# Patient Record
Sex: MALE | Race: WHITE | Employment: UNEMPLOYED | URBAN - METROPOLITAN AREA
[De-identification: names, ages, dates, MRNs, and addresses within clinical notes are randomized per-mention and may not be internally consistent; named-entity substitution may affect disease eponyms.]

---
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
## 2018-05-05 ENCOUNTER — HOSPITAL ENCOUNTER (EMERGENCY)
Facility: HOSPITAL | Age: 18
Discharge: HOME | End: 2018-05-05
Attending: EMERGENCY MEDICINE
Payer: COMMERCIAL

## 2018-05-05 ENCOUNTER — APPOINTMENT (OUTPATIENT)
Dept: CT IMAGING | Facility: HOSPITAL | Age: 18
End: 2018-05-05
Attending: EMERGENCY MEDICINE
Payer: COMMERCIAL

## 2018-05-05 VITALS
HEART RATE: 57 BPM | RESPIRATION RATE: 16 BRPM | SYSTOLIC BLOOD PRESSURE: 133 MMHG | TEMPERATURE: 98 F | WEIGHT: 238 LBS | OXYGEN SATURATION: 98 % | DIASTOLIC BLOOD PRESSURE: 84 MMHG | BODY MASS INDEX: 27 KG/M2

## 2018-05-05 DIAGNOSIS — R11.2 NON-INTRACTABLE VOMITING WITH NAUSEA, UNSPECIFIED VOMITING TYPE: ICD-10-CM

## 2018-05-05 DIAGNOSIS — N20.0 KIDNEY STONES: Primary | ICD-10-CM

## 2018-05-05 PROCEDURE — 96375 TX/PRO/DX INJ NEW DRUG ADDON: CPT

## 2018-05-05 PROCEDURE — 74176 CT ABD & PELVIS W/O CONTRAST: CPT | Performed by: EMERGENCY MEDICINE

## 2018-05-05 PROCEDURE — 99284 EMERGENCY DEPT VISIT MOD MDM: CPT

## 2018-05-05 PROCEDURE — 85652 RBC SED RATE AUTOMATED: CPT | Performed by: EMERGENCY MEDICINE

## 2018-05-05 PROCEDURE — 83690 ASSAY OF LIPASE: CPT | Performed by: EMERGENCY MEDICINE

## 2018-05-05 PROCEDURE — 96361 HYDRATE IV INFUSION ADD-ON: CPT

## 2018-05-05 PROCEDURE — 81001 URINALYSIS AUTO W/SCOPE: CPT | Performed by: EMERGENCY MEDICINE

## 2018-05-05 PROCEDURE — 86140 C-REACTIVE PROTEIN: CPT | Performed by: EMERGENCY MEDICINE

## 2018-05-05 PROCEDURE — 85025 COMPLETE CBC W/AUTO DIFF WBC: CPT | Performed by: EMERGENCY MEDICINE

## 2018-05-05 PROCEDURE — S0028 INJECTION, FAMOTIDINE, 20 MG: HCPCS | Performed by: EMERGENCY MEDICINE

## 2018-05-05 PROCEDURE — 80053 COMPREHEN METABOLIC PANEL: CPT | Performed by: EMERGENCY MEDICINE

## 2018-05-05 PROCEDURE — 96374 THER/PROPH/DIAG INJ IV PUSH: CPT

## 2018-05-05 RX ORDER — ONDANSETRON 4 MG/1
4 TABLET, ORALLY DISINTEGRATING ORAL ONCE
Status: DISCONTINUED | OUTPATIENT
Start: 2018-05-05 | End: 2018-05-05

## 2018-05-05 RX ORDER — ONDANSETRON 4 MG/1
4 TABLET, ORALLY DISINTEGRATING ORAL EVERY 8 HOURS PRN
Qty: 10 TABLET | Refills: 0 | Status: SHIPPED | OUTPATIENT
Start: 2018-05-05 | End: 2018-05-12

## 2018-05-05 RX ORDER — ONDANSETRON 2 MG/ML
4 INJECTION INTRAMUSCULAR; INTRAVENOUS ONCE
Status: COMPLETED | OUTPATIENT
Start: 2018-05-05 | End: 2018-05-05

## 2018-05-05 RX ORDER — KETOROLAC TROMETHAMINE 30 MG/ML
30 INJECTION, SOLUTION INTRAMUSCULAR; INTRAVENOUS ONCE
Status: COMPLETED | OUTPATIENT
Start: 2018-05-05 | End: 2018-05-05

## 2018-05-05 RX ORDER — FAMOTIDINE 10 MG/ML
20 INJECTION, SOLUTION INTRAVENOUS ONCE
Status: COMPLETED | OUTPATIENT
Start: 2018-05-05 | End: 2018-05-05

## 2018-05-05 NOTE — ED PROVIDER NOTES
Patient Seen in: [facility] Emergency Department    History   Patient presents with:  Nausea/Vomiting/Diarrhea (gastrointestinal)    Stated Complaint: vomiting    HPI    Patient's 33-year-old said he had 10 days of intermittent vomiting and diarrhea hepatomegaly, no masses. No CVA tenderness or suprapubic tenderness. No pain at McBurney's point. No rebound or guarding. Normal bowel sounds. No scrotal pain or swelling. EXTREMITIES: Peripheral pulses are brisk in all 4 extremities.   Normal capilla stones in the cranio-caudal plane. Dose reduction techniques were used. Dose information is transmitted to the [facility] [employer] of Radiology) NRDR ([address]) which includes the Dose Index Registry.   PATIENT STATED HISTORY: (As 5/05/2018 at 16:02     Approved by: Tre Meehan MD            [facility]   Patient received 2 L of IV normal saline, IV Zofran, IV Pepcid, and IV Toradol. Patient said his symptoms greatly improved. Multiple small kidney stones without obstruction on CT.

## 2018-08-22 NOTE — LETTER
12/18/20        Solis Dominguez  [address]  [place] [address]      Dear Yemi Che,    [facility] records indicate that you have outstanding lab work and or testing that was ordered for you and has not yet been completed:  Orders Placed This Encounter Labs look good. Follow up as planned during your appointment.

## 2018-09-05 ENCOUNTER — LAB ENCOUNTER (OUTPATIENT)
Dept: LAB | Age: 18
End: 2018-09-05
Attending: DERMATOLOGY
Payer: COMMERCIAL

## 2018-09-05 DIAGNOSIS — L70.0 ACNE VULGARIS: ICD-10-CM

## 2018-09-05 LAB
ALBUMIN SERPL-MCNC: 4.3 G/DL (ref 3.5–4.8)
ALBUMIN/GLOB SERPL: 1.2 {RATIO} (ref 1–2)
ALP LIVER SERPL-CCNC: 65 U/L (ref 45–117)
ALT SERPL-CCNC: 29 U/L (ref 17–63)
ANION GAP SERPL CALC-SCNC: 8 MMOL/L (ref 0–18)
AST SERPL-CCNC: 16 U/L (ref 15–41)
BILIRUB SERPL-MCNC: 0.9 MG/DL (ref 0.1–2)
BUN BLD-MCNC: 13 MG/DL (ref 8–20)
BUN/CREAT SERPL: 11 (ref 10–20)
CALCIUM BLD-MCNC: 9.6 MG/DL (ref 8.3–10.3)
CHLORIDE SERPL-SCNC: 107 MMOL/L (ref 101–111)
CHOLEST SMN-MCNC: 138 MG/DL (ref ?–200)
CO2 SERPL-SCNC: 28 MMOL/L (ref 22–32)
CREAT BLD-MCNC: 1.18 MG/DL (ref 0.7–1.3)
GLOBULIN PLAS-MCNC: 3.6 G/DL (ref 2.5–4)
GLUCOSE BLD-MCNC: 81 MG/DL (ref 70–99)
HDLC SERPL-MCNC: 40 MG/DL (ref 40–59)
LDLC SERPL CALC-MCNC: 76 MG/DL (ref ?–100)
M PROTEIN MFR SERPL ELPH: 7.9 G/DL (ref 6.1–8.3)
NONHDLC SERPL-MCNC: 98 MG/DL (ref ?–130)
OSMOLALITY SERPL CALC.SUM OF ELEC: 295 MOSM/KG (ref 275–295)
POTASSIUM SERPL-SCNC: 4.6 MMOL/L (ref 3.6–5.1)
SODIUM SERPL-SCNC: 143 MMOL/L (ref 136–144)
TRIGL SERPL-MCNC: 110 MG/DL (ref 30–149)
VLDLC SERPL CALC-MCNC: 22 MG/DL (ref 0–30)

## 2018-09-05 PROCEDURE — 80053 COMPREHEN METABOLIC PANEL: CPT

## 2018-09-05 PROCEDURE — 80061 LIPID PANEL: CPT

## 2018-10-05 ENCOUNTER — LAB ENCOUNTER (OUTPATIENT)
Dept: LAB | Age: 18
End: 2018-10-05
Attending: DERMATOLOGY
Payer: COMMERCIAL

## 2018-10-05 DIAGNOSIS — Z79.899 ON ISOTRETINOIN THERAPY: ICD-10-CM

## 2018-10-05 DIAGNOSIS — L70.0 ACNE VULGARIS: ICD-10-CM

## 2018-10-05 PROCEDURE — 80053 COMPREHEN METABOLIC PANEL: CPT

## 2018-10-05 PROCEDURE — 80061 LIPID PANEL: CPT

## 2018-11-02 ENCOUNTER — APPOINTMENT (OUTPATIENT)
Dept: LAB | Age: 18
End: 2018-11-02
Attending: DERMATOLOGY
Payer: COMMERCIAL

## 2018-11-02 DIAGNOSIS — L70.0 ACNE VULGARIS: ICD-10-CM

## 2018-11-02 DIAGNOSIS — Z79.899 ON ISOTRETINOIN THERAPY: ICD-10-CM

## 2018-11-02 PROCEDURE — 80053 COMPREHEN METABOLIC PANEL: CPT

## 2018-11-02 PROCEDURE — 80061 LIPID PANEL: CPT

## 2019-02-04 ENCOUNTER — LAB ENCOUNTER (OUTPATIENT)
Dept: LAB | Age: 19
End: 2019-02-04
Attending: DERMATOLOGY
Payer: COMMERCIAL

## 2019-02-04 DIAGNOSIS — L70.0 ACNE VULGARIS: ICD-10-CM

## 2019-02-04 DIAGNOSIS — Z79.899 ON ISOTRETINOIN THERAPY: ICD-10-CM

## 2019-02-04 LAB
ALBUMIN SERPL-MCNC: 4.2 G/DL (ref 3.1–4.5)
ALBUMIN/GLOB SERPL: 1.2 {RATIO} (ref 1–2)
ALP LIVER SERPL-CCNC: 69 U/L (ref 45–117)
ALT SERPL-CCNC: 31 U/L (ref 17–63)
ANION GAP SERPL CALC-SCNC: 9 MMOL/L (ref 0–18)
AST SERPL-CCNC: 23 U/L (ref 15–41)
BILIRUB SERPL-MCNC: 0.6 MG/DL (ref 0.1–2)
BUN BLD-MCNC: 10 MG/DL (ref 8–20)
BUN/CREAT SERPL: 11.2 (ref 10–20)
CALCIUM BLD-MCNC: 9.4 MG/DL (ref 8.3–10.3)
CHLORIDE SERPL-SCNC: 107 MMOL/L (ref 101–111)
CHOLEST SMN-MCNC: 189 MG/DL (ref ?–200)
CO2 SERPL-SCNC: 27 MMOL/L (ref 22–32)
CREAT BLD-MCNC: 0.89 MG/DL (ref 0.7–1.3)
GLOBULIN PLAS-MCNC: 3.5 G/DL (ref 2.8–4.4)
GLUCOSE BLD-MCNC: 99 MG/DL (ref 70–99)
HDLC SERPL-MCNC: 40 MG/DL (ref 40–59)
LDLC SERPL CALC-MCNC: 117 MG/DL (ref ?–100)
M PROTEIN MFR SERPL ELPH: 7.7 G/DL (ref 6.4–8.2)
NONHDLC SERPL-MCNC: 149 MG/DL (ref ?–130)
OSMOLALITY SERPL CALC.SUM OF ELEC: 295 MOSM/KG (ref 275–295)
POTASSIUM SERPL-SCNC: 4.3 MMOL/L (ref 3.6–5.1)
SODIUM SERPL-SCNC: 143 MMOL/L (ref 136–144)
TRIGL SERPL-MCNC: 159 MG/DL (ref 30–149)
VLDLC SERPL CALC-MCNC: 32 MG/DL (ref 0–30)

## 2019-02-04 PROCEDURE — 80061 LIPID PANEL: CPT

## 2019-02-04 PROCEDURE — 80053 COMPREHEN METABOLIC PANEL: CPT

## 2020-06-08 ENCOUNTER — OFFICE VISIT (OUTPATIENT)
Dept: SURGERY | Facility: CLINIC | Age: 20
End: 2020-06-08

## 2020-06-08 VITALS
BODY MASS INDEX: 25 KG/M2 | TEMPERATURE: 98 F | DIASTOLIC BLOOD PRESSURE: 72 MMHG | HEART RATE: 77 BPM | WEIGHT: 220 LBS | SYSTOLIC BLOOD PRESSURE: 109 MMHG

## 2020-06-08 DIAGNOSIS — Z84.81 FAMILY HISTORY OF BRCA GENE MUTATION: ICD-10-CM

## 2020-06-08 DIAGNOSIS — N63.0 BREAST MASS IN MALE: ICD-10-CM

## 2020-06-08 DIAGNOSIS — Z80.3 FAMILY HISTORY OF BREAST CANCER IN MOTHER: ICD-10-CM

## 2020-06-08 DIAGNOSIS — N62 GYNECOMASTIA: Primary | ICD-10-CM

## 2020-06-08 PROCEDURE — 99245 OFF/OP CONSLTJ NEW/EST HI 55: CPT | Performed by: COLON & RECTAL SURGERY

## 2020-06-08 NOTE — PATIENT INSTRUCTIONS
The patient presents today in consultation of Dr. Marcela Miller with his mother for breast pain and masses palpated on the breast.    The patient states that the last 4 years he has noticed changes around his nipples looking \"puffy. \"  The patient states that possible that the isotretinoin could have affected his hormone levels causing his breast bud development to increase.   I also discussed with patient that physiologically it is common for males between the ages of 13 and 34 to have some breast bud developme

## 2020-06-08 NOTE — H&P
New Patient Visit Note       Active Problems      1. Gynecomastia    2. Breast mass in male    3. Family history of breast cancer in mother at 46    2.  Family history of BRCA gene mutation        Chief Complaint   Patient presents with:  Breast Problem: pt performed my own physical exam and obtained the history as detailed above.   I have made all appropriate changes in documentation as necessary  I attest to the accuracy of this note as detailed above    Tracie Mckenzie MD FACS FASCRS    My total face time Genitourinary: Negative for difficulty urinating, dysuria, frequency and urgency. Musculoskeletal: Negative for arthralgias and myalgias. Skin: Negative for color change and rash. Neurological: Negative for tremors, syncope and weakness.    Hematolo behind the left nipple. There are no other palpable masses. There is no left axillary lymphadenopathy. There are no skin changes or nipple drainage. Abdominal: Soft.  Normal appearance and bowel sounds are normal. He exhibits no shifting dullness, no d 1 year it has continued to worsen. He does feel that there is some masses around his nipples as well. The patient states that he has not had any skin changes, nipple drainage or discharge.   He states that he did notice some change in his breast when he hormones. I discussed the patient that it is a good sign that it is bilaterally present, and that they are painful. It is unlikely that this is a cancer. However, the patient does have strong family history.     We will be working the patient up for ot

## 2020-06-18 ENCOUNTER — HOSPITAL ENCOUNTER (OUTPATIENT)
Dept: ULTRASOUND IMAGING | Age: 20
Discharge: HOME | End: 2020-06-18
Attending: COLON & RECTAL SURGERY
Payer: COMMERCIAL

## 2020-06-18 ENCOUNTER — APPOINTMENT (OUTPATIENT)
Dept: LAB | Age: 20
End: 2020-06-18
Attending: PHYSICIAN ASSISTANT
Payer: COMMERCIAL

## 2020-06-18 DIAGNOSIS — N62 GYNECOMASTIA: ICD-10-CM

## 2020-06-18 PROCEDURE — 84703 CHORIONIC GONADOTROPIN ASSAY: CPT

## 2020-06-18 PROCEDURE — 76870 US EXAM SCROTUM: CPT | Performed by: COLON & RECTAL SURGERY

## 2020-06-18 PROCEDURE — 84146 ASSAY OF PROLACTIN: CPT

## 2020-06-18 PROCEDURE — 93975 VASCULAR STUDY: CPT | Performed by: COLON & RECTAL SURGERY

## 2020-06-18 PROCEDURE — 82670 ASSAY OF TOTAL ESTRADIOL: CPT

## 2020-06-18 PROCEDURE — 84403 ASSAY OF TOTAL TESTOSTERONE: CPT

## 2020-06-18 PROCEDURE — 84402 ASSAY OF FREE TESTOSTERONE: CPT

## 2020-06-18 PROCEDURE — 84443 ASSAY THYROID STIM HORMONE: CPT

## 2020-06-18 PROCEDURE — 84704 HCG FREE BETACHAIN TEST: CPT

## 2020-06-18 PROCEDURE — 83002 ASSAY OF GONADOTROPIN (LH): CPT

## 2020-06-18 PROCEDURE — 36415 COLL VENOUS BLD VENIPUNCTURE: CPT

## 2020-06-19 ENCOUNTER — TELEPHONE (OUTPATIENT)
Dept: SURGERY | Facility: CLINIC | Age: 20
End: 2020-06-19

## 2020-06-19 ENCOUNTER — HOSPITAL ENCOUNTER (OUTPATIENT)
Dept: MAMMOGRAPHY | Age: 20
Discharge: HOME | End: 2020-06-19
Attending: COLON & RECTAL SURGERY
Payer: COMMERCIAL

## 2020-06-19 ENCOUNTER — HOSPITAL ENCOUNTER (OUTPATIENT)
Dept: ULTRASOUND IMAGING | Age: 20
Discharge: HOME | End: 2020-06-19
Attending: COLON & RECTAL SURGERY
Payer: COMMERCIAL

## 2020-06-19 DIAGNOSIS — N62 GYNECOMASTIA: ICD-10-CM

## 2020-06-19 DIAGNOSIS — N62 GYNECOMASTIA: Primary | ICD-10-CM

## 2020-06-19 PROCEDURE — 77062 BREAST TOMOSYNTHESIS BI: CPT | Performed by: COLON & RECTAL SURGERY

## 2020-06-19 PROCEDURE — 77066 DX MAMMO INCL CAD BI: CPT | Performed by: COLON & RECTAL SURGERY

## 2020-06-19 PROCEDURE — 76641 ULTRASOUND BREAST COMPLETE: CPT | Performed by: COLON & RECTAL SURGERY

## 2020-06-19 NOTE — TELEPHONE ENCOUNTER
PAT called to correct the HCG order, a HCG qualitative was ordered which is a pregnancy test, a HCG Tumor marker needed to be ordered.

## 2020-06-23 NOTE — PROGRESS NOTES
Future Appointments  6/29/2020  5:15 PM    Dafne Crawley MD          [facility]

## 2020-06-29 ENCOUNTER — OFFICE VISIT (OUTPATIENT)
Dept: SURGERY | Facility: CLINIC | Age: 20
End: 2020-06-29

## 2020-06-29 VITALS — WEIGHT: 220 LBS | BODY MASS INDEX: 25.45 KG/M2 | HEIGHT: 78 IN | TEMPERATURE: 98 F

## 2020-06-29 DIAGNOSIS — N62 GYNECOMASTIA: ICD-10-CM

## 2020-06-29 DIAGNOSIS — Z84.81 FAMILY HISTORY OF BRCA GENE MUTATION: ICD-10-CM

## 2020-06-29 DIAGNOSIS — N63.0 BREAST MASS IN MALE: Primary | ICD-10-CM

## 2020-06-29 DIAGNOSIS — Z80.3 FAMILY HISTORY OF BREAST CANCER IN MOTHER: ICD-10-CM

## 2020-06-29 PROCEDURE — 99213 OFFICE O/P EST LOW 20 MIN: CPT | Performed by: COLON & RECTAL SURGERY

## 2020-06-29 NOTE — PATIENT INSTRUCTIONS
The patient presents today for continued care and evaluation of his bilateral gynecomastia. The patient presents today after undergoing multiple laboratory studies and imaging after being seen for his bilateral gynecomastia.     The patient states that h breast masses. All of the laboratory and imaging results were reviewed with both the patient as well as his mother. All of their questions were answered at this time. They both verbalized understanding agreement the plan of care.     We will see the aida

## 2020-06-29 NOTE — PROGRESS NOTES
Follow Up Visit Note       Active Problems      1. Breast mass in male    2. Gynecomastia    3. Family history of breast cancer in mother at 46    2.  Family history of BRCA gene mutation          Chief Complaint   Patient presents with:  Breast Problem: Es on the above listed diagnoses and treatment options. Allergies  Cate Felling is allergic to erythromycin. Past Medical / Surgical / Social / Family History    The past medical and past surgical history have been reviewed by me today.     Past Medical His bruise/bleed easily. Psychiatric/Behavioral: Negative for behavioral problems and sleep disturbance.         Physical Findings   Temp 98 °F (36.7 °C) (Oral)   Ht 79\"   Wt 220 lb (99.8 kg)   BMI 24.78 kg/m²   Physical Exam   Constitutional: He is oriented negative in the ventral area, confirmed negative in the right inguinal area and confirmed negative in the left inguinal area. Musculoskeletal: Normal range of motion. Lymphadenopathy:     He has no cervical adenopathy.    Neurological: He is alert and o behind the left nipple that is smaller than the mass on the right. There are no other palpable masses. There is no left axillary lymphadenopathy. There are no skin changes or nipple drainage.     It was discussed with the patient that there was some asym

## 2020-07-11 ENCOUNTER — APPOINTMENT (OUTPATIENT)
Dept: CT IMAGING | Facility: HOSPITAL | Age: 20
End: 2020-07-11
Attending: EMERGENCY MEDICINE
Payer: COMMERCIAL

## 2020-07-11 ENCOUNTER — HOSPITAL ENCOUNTER (EMERGENCY)
Facility: HOSPITAL | Age: 20
Discharge: HOME | End: 2020-07-11
Attending: EMERGENCY MEDICINE
Payer: COMMERCIAL

## 2020-07-11 VITALS
OXYGEN SATURATION: 100 % | WEIGHT: 215 LBS | DIASTOLIC BLOOD PRESSURE: 75 MMHG | HEIGHT: 78 IN | SYSTOLIC BLOOD PRESSURE: 119 MMHG | RESPIRATION RATE: 14 BRPM | HEART RATE: 67 BPM | TEMPERATURE: 98 F | BODY MASS INDEX: 24.88 KG/M2

## 2020-07-11 DIAGNOSIS — R10.9 FLANK PAIN: Primary | ICD-10-CM

## 2020-07-11 DIAGNOSIS — R30.0 DYSURIA: ICD-10-CM

## 2020-07-11 LAB
ALBUMIN SERPL-MCNC: 4.3 G/DL (ref 3.4–5)
ALBUMIN/GLOB SERPL: 1.3 {RATIO} (ref 1–2)
ALP LIVER SERPL-CCNC: 73 U/L (ref 45–117)
ALT SERPL-CCNC: 32 U/L (ref 16–61)
ANION GAP SERPL CALC-SCNC: 8 MMOL/L (ref 0–18)
AST SERPL-CCNC: 22 U/L (ref 15–37)
BASOPHILS # BLD AUTO: 0.03 X10(3) UL (ref 0–0.2)
BASOPHILS NFR BLD AUTO: 0.3 %
BILIRUB SERPL-MCNC: 1.3 MG/DL (ref 0.1–2)
BILIRUB UR QL STRIP.AUTO: NEGATIVE
BUN BLD-MCNC: 12 MG/DL (ref 7–18)
BUN/CREAT SERPL: 10.7 (ref 10–20)
CALCIUM BLD-MCNC: 9.5 MG/DL (ref 8.5–10.1)
CHLORIDE SERPL-SCNC: 106 MMOL/L (ref 98–112)
CLARITY UR REFRACT.AUTO: CLEAR
CO2 SERPL-SCNC: 26 MMOL/L (ref 21–32)
COLOR UR AUTO: YELLOW
CREAT BLD-MCNC: 1.12 MG/DL (ref 0.7–1.3)
DEPRECATED RDW RBC AUTO: 39.1 FL (ref 35.1–46.3)
EOSINOPHIL # BLD AUTO: 0.09 X10(3) UL (ref 0–0.7)
EOSINOPHIL NFR BLD AUTO: 0.8 %
ERYTHROCYTE [DISTWIDTH] IN BLOOD BY AUTOMATED COUNT: 12.2 % (ref 11–15)
GLOBULIN PLAS-MCNC: 3.2 G/DL (ref 2.8–4.4)
GLUCOSE BLD-MCNC: 106 MG/DL (ref 70–99)
GLUCOSE UR STRIP.AUTO-MCNC: NEGATIVE MG/DL
HCT VFR BLD AUTO: 45.7 % (ref 39–53)
HGB BLD-MCNC: 15.2 G/DL (ref 13–17.5)
IMM GRANULOCYTES # BLD AUTO: 0.07 X10(3) UL (ref 0–1)
IMM GRANULOCYTES NFR BLD: 0.7 %
KETONES UR STRIP.AUTO-MCNC: NEGATIVE MG/DL
LIPASE SERPL-CCNC: 47 U/L (ref 73–393)
LYMPHOCYTES # BLD AUTO: 3.07 X10(3) UL (ref 1–4)
LYMPHOCYTES NFR BLD AUTO: 28.7 %
M PROTEIN MFR SERPL ELPH: 7.5 G/DL (ref 6.4–8.2)
MCH RBC QN AUTO: 29.1 PG (ref 26–34)
MCHC RBC AUTO-ENTMCNC: 33.3 G/DL (ref 31–37)
MCV RBC AUTO: 87.4 FL (ref 80–100)
MONOCYTES # BLD AUTO: 1.22 X10(3) UL (ref 0.1–1)
MONOCYTES NFR BLD AUTO: 11.4 %
NEUTROPHILS # BLD AUTO: 6.23 X10 (3) UL (ref 1.5–7.7)
NEUTROPHILS # BLD AUTO: 6.23 X10(3) UL (ref 1.5–7.7)
NEUTROPHILS NFR BLD AUTO: 58.1 %
NITRITE UR QL STRIP.AUTO: NEGATIVE
OSMOLALITY SERPL CALC.SUM OF ELEC: 290 MOSM/KG (ref 275–295)
PH UR STRIP.AUTO: 5 [PH] (ref 4.5–8)
PLATELET # BLD AUTO: 204 10(3)UL (ref 150–450)
POTASSIUM SERPL-SCNC: 3.6 MMOL/L (ref 3.5–5.1)
PROT UR STRIP.AUTO-MCNC: NEGATIVE MG/DL
RBC # BLD AUTO: 5.23 X10(6)UL (ref 4.3–5.7)
RBC #/AREA URNS AUTO: >10 /HPF
SODIUM SERPL-SCNC: 140 MMOL/L (ref 136–145)
SP GR UR STRIP.AUTO: 1.03 (ref 1–1.03)
UROBILINOGEN UR STRIP.AUTO-MCNC: <2 MG/DL
WBC # BLD AUTO: 10.7 X10(3) UL (ref 4–11)

## 2020-07-11 PROCEDURE — 87086 URINE CULTURE/COLONY COUNT: CPT | Performed by: EMERGENCY MEDICINE

## 2020-07-11 PROCEDURE — 99284 EMERGENCY DEPT VISIT MOD MDM: CPT

## 2020-07-11 PROCEDURE — 74176 CT ABD & PELVIS W/O CONTRAST: CPT | Performed by: EMERGENCY MEDICINE

## 2020-07-11 PROCEDURE — 83690 ASSAY OF LIPASE: CPT | Performed by: EMERGENCY MEDICINE

## 2020-07-11 PROCEDURE — 96365 THER/PROPH/DIAG IV INF INIT: CPT

## 2020-07-11 PROCEDURE — 81001 URINALYSIS AUTO W/SCOPE: CPT | Performed by: EMERGENCY MEDICINE

## 2020-07-11 PROCEDURE — 96375 TX/PRO/DX INJ NEW DRUG ADDON: CPT

## 2020-07-11 PROCEDURE — 80053 COMPREHEN METABOLIC PANEL: CPT | Performed by: EMERGENCY MEDICINE

## 2020-07-11 PROCEDURE — 96361 HYDRATE IV INFUSION ADD-ON: CPT

## 2020-07-11 PROCEDURE — 85025 COMPLETE CBC W/AUTO DIFF WBC: CPT | Performed by: EMERGENCY MEDICINE

## 2020-07-11 RX ORDER — HYDROMORPHONE HYDROCHLORIDE 1 MG/ML
0.5 INJECTION, SOLUTION INTRAMUSCULAR; INTRAVENOUS; SUBCUTANEOUS ONCE
Status: COMPLETED | OUTPATIENT
Start: 2020-07-11 | End: 2020-07-11

## 2020-07-11 RX ORDER — SULFAMETHOXAZOLE AND TRIMETHOPRIM 800; 160 MG/1; MG/1
1 TABLET ORAL 2 TIMES DAILY
Qty: 20 TABLET | Refills: 0 | Status: SHIPPED | OUTPATIENT
Start: 2020-07-11 | End: 2020-07-21

## 2020-07-11 RX ORDER — SODIUM CHLORIDE 9 MG/ML
INJECTION, SOLUTION INTRAVENOUS CONTINUOUS
Status: DISCONTINUED | OUTPATIENT
Start: 2020-07-11 | End: 2020-07-11

## 2020-07-11 RX ORDER — HYDROMORPHONE HYDROCHLORIDE 1 MG/ML
1 INJECTION, SOLUTION INTRAMUSCULAR; INTRAVENOUS; SUBCUTANEOUS EVERY 30 MIN PRN
Status: DISCONTINUED | OUTPATIENT
Start: 2020-07-11 | End: 2020-07-11

## 2020-07-11 RX ORDER — ONDANSETRON 2 MG/ML
4 INJECTION INTRAMUSCULAR; INTRAVENOUS ONCE
Status: COMPLETED | OUTPATIENT
Start: 2020-07-11 | End: 2020-07-11

## 2020-07-11 RX ORDER — ONDANSETRON 8 MG/1
8 TABLET, ORALLY DISINTEGRATING ORAL EVERY 6 HOURS PRN
Qty: 10 TABLET | Refills: 0 | Status: SHIPPED | OUTPATIENT
Start: 2020-07-11 | End: 2020-11-17 | Stop reason: ALTCHOICE

## 2020-07-11 RX ORDER — HYDROCODONE BITARTRATE AND ACETAMINOPHEN 5; 325 MG/1; MG/1
1-2 TABLET ORAL EVERY 6 HOURS PRN
Qty: 10 TABLET | Refills: 0 | Status: SHIPPED | OUTPATIENT
Start: 2020-07-11 | End: 2020-07-18

## 2020-07-11 RX ORDER — ONDANSETRON 2 MG/ML
4 INJECTION INTRAMUSCULAR; INTRAVENOUS
Status: DISCONTINUED | OUTPATIENT
Start: 2020-07-11 | End: 2020-07-11

## 2020-07-11 RX ORDER — AZITHROMYCIN 250 MG/1
1000 TABLET, FILM COATED ORAL ONCE
Status: COMPLETED | OUTPATIENT
Start: 2020-07-11 | End: 2020-07-11

## 2020-07-11 NOTE — ED INITIAL ASSESSMENT (HPI)
Pt c/o R flank pain started about an hour ago with dysuria and white discharge from penis. Pt states only being with 1 person a month ago and was with her for 3 months.

## 2020-07-11 NOTE — ED PROVIDER NOTES
Patient Seen in: [facility] Emergency Department      History   Patient presents with:  Abdomen/Flank Pain    Stated Complaint: right flank pain    HPI    Patient complains of right-sided flank pain that started about 1 hour prior to arrival.  There S2, without murmur or rub. Distal pulses are strong and symmetric. Abdomen: Soft, nondistended. Mildly tender in the right mid abdomen. Mild right CVA tenderness also noted. Extremities: Unremarkable. Calves nonswollen, symmetric, nontender.   No peda flank pain suggest renal colic. He has a family history of the same  Patient is reporting some discharge from the penis ongoing for about for 5 days prior to arrival.  He is sexually active. STD such as gonorrhea or chlamydia considered.   STD probe from taking these medications    Sulfamethoxazole-TMP -160 MG Oral Tab per tablet  Take 1 tablet by mouth 2 (two) times daily for 10 days.   Qty: 20 tablet Refills: 0    ondansetron 8 MG Oral Tablet Dispersible  Take 1 tablet (8 mg total) by mouth every 6

## 2020-11-17 ENCOUNTER — OFFICE VISIT (OUTPATIENT)
Dept: FAMILY MEDICINE CLINIC | Facility: CLINIC | Age: 20
End: 2020-11-17

## 2020-11-17 VITALS
HEART RATE: 69 BPM | TEMPERATURE: 97 F | RESPIRATION RATE: 16 BRPM | SYSTOLIC BLOOD PRESSURE: 115 MMHG | OXYGEN SATURATION: 98 % | WEIGHT: 233 LBS | DIASTOLIC BLOOD PRESSURE: 72 MMHG | HEIGHT: 78 IN | BODY MASS INDEX: 26.96 KG/M2

## 2020-11-17 DIAGNOSIS — Z23 NEED FOR VACCINATION: ICD-10-CM

## 2020-11-17 DIAGNOSIS — Z00.00 ANNUAL PHYSICAL EXAM: Primary | ICD-10-CM

## 2020-11-17 DIAGNOSIS — E55.9 VITAMIN D DEFICIENCY: ICD-10-CM

## 2020-11-17 DIAGNOSIS — L65.9 HAIR LOSS: ICD-10-CM

## 2020-11-17 PROCEDURE — 3078F DIAST BP <80 MM HG: CPT | Performed by: FAMILY MEDICINE

## 2020-11-17 PROCEDURE — 3074F SYST BP LT 130 MM HG: CPT | Performed by: FAMILY MEDICINE

## 2020-11-17 PROCEDURE — 99385 PREV VISIT NEW AGE 18-39: CPT | Performed by: FAMILY MEDICINE

## 2020-11-17 PROCEDURE — 90686 IIV4 VACC NO PRSV 0.5 ML IM: CPT | Performed by: FAMILY MEDICINE

## 2020-11-17 PROCEDURE — 90471 IMMUNIZATION ADMIN: CPT | Performed by: FAMILY MEDICINE

## 2020-11-17 PROCEDURE — 3008F BODY MASS INDEX DOCD: CPT | Performed by: FAMILY MEDICINE

## 2020-11-17 RX ORDER — ALBUTEROL SULFATE 90 UG/1
2 AEROSOL, METERED RESPIRATORY (INHALATION) EVERY 6 HOURS PRN
Qty: 1 INHALER | Refills: 1 | Status: SHIPPED | OUTPATIENT
Start: 2020-11-17

## 2020-11-18 NOTE — H&P
Emilee Fuller is a 23year old male who presents for a complete physical exam.   HPI:   Hair Loss  Pt complains of hair loss. This is a chronic problem which onset over 1 year ago. This problem has been gradually worsening.  The hair loss is equally sprea changes in stream, denies erectile dysfunction  MS: denies back pain, arthralgias or myalgias  NEURO: denies headaches or dizziness  PSYCH: denies depression or anxiety  HEMATOLOGIC: denies hx of anemia, easy bruising or bleeding  ENDOCRINE:denies frequent complete testosterone level to rule out low testosterone. He should begin rogaine for treatment.   - TESTOSTERONE,TOTAL AND FREE MALE; Future  Orders Placed This Encounter      CBC W Differential W Platelet [E]          Standing Status: Future          Manus Cardinal

## 2020-12-10 ENCOUNTER — TELEMEDICINE (OUTPATIENT)
Dept: FAMILY MEDICINE CLINIC | Facility: CLINIC | Age: 20
End: 2020-12-10

## 2020-12-10 DIAGNOSIS — B00.1 RECURRENT COLD SORES: Primary | ICD-10-CM

## 2020-12-10 PROCEDURE — 99213 OFFICE O/P EST LOW 20 MIN: CPT | Performed by: INTERNAL MEDICINE

## 2020-12-10 RX ORDER — VALACYCLOVIR HYDROCHLORIDE 1 G/1
TABLET, FILM COATED ORAL
Qty: 16 TABLET | Refills: 1 | Status: SHIPPED | OUTPATIENT
Start: 2020-12-10

## 2020-12-10 RX ORDER — VALACYCLOVIR HYDROCHLORIDE 1 G/1
TABLET, FILM COATED ORAL
Qty: 16 TABLET | Refills: 1 | Status: SHIPPED | OUTPATIENT
Start: 2020-12-10 | End: 2020-12-10

## 2020-12-10 NOTE — PROGRESS NOTES
Video Visit  Emilee Fuller is a 23year old male. Patient presents with:  Medication Follow-Up: doximity video      HPI:   Pt requests a virtual visit due to the Covid pandemic to discuss recurrent cold sores.   + FH both parents with recurrent cold sore and ways to reduce transmission      Follow up if not better, pt agrees          The patient indicates understanding of these issues and agrees to the plan. No follow-ups on file.       Sherel Boeck, NP  12/10/2020  9:29 AM    Clara Veliz und

## 2021-02-23 ENCOUNTER — HOSPITAL ENCOUNTER (OUTPATIENT)
Age: 21
Discharge: HOME | End: 2021-02-23
Attending: EMERGENCY MEDICINE
Payer: COMMERCIAL

## 2021-02-23 VITALS
DIASTOLIC BLOOD PRESSURE: 47 MMHG | WEIGHT: 235 LBS | SYSTOLIC BLOOD PRESSURE: 115 MMHG | HEIGHT: 78 IN | RESPIRATION RATE: 20 BRPM | BODY MASS INDEX: 27.19 KG/M2 | HEART RATE: 88 BPM | TEMPERATURE: 98 F | OXYGEN SATURATION: 96 %

## 2021-02-23 DIAGNOSIS — J02.9 VIRAL PHARYNGITIS: Primary | ICD-10-CM

## 2021-02-23 LAB — POCT RAPID STREP: NEGATIVE

## 2021-02-23 PROCEDURE — 99213 OFFICE O/P EST LOW 20 MIN: CPT

## 2021-02-23 PROCEDURE — 87880 STREP A ASSAY W/OPTIC: CPT | Performed by: EMERGENCY MEDICINE

## 2021-02-23 PROCEDURE — 87081 CULTURE SCREEN ONLY: CPT | Performed by: EMERGENCY MEDICINE

## 2021-02-23 PROCEDURE — 99214 OFFICE O/P EST MOD 30 MIN: CPT

## 2021-02-23 NOTE — ED INITIAL ASSESSMENT (HPI)
Pt presents today with c/o a dry throat x 1 week. Pt denies any fevers. Pt tested negative for covid x a few days ago.

## 2021-02-23 NOTE — ED PROVIDER NOTES
Patient Seen in: [facility] [place]      History   Patient presents with:  Throat Problem    Stated Complaint: DRY THROAT     HPI/Subjective:   HPI    Patient is a 67-year-old male otherwise healthy presents the immediate care with a history of d light. Oropharynx is mildly erythematous not exudate or abscess. The patient's uvula is midline. Mucous membranes are moist.  NECK: There is no focal tenderness to palpation appreciated. There is no JVD. No meningeal signs or nuchal rigidity appreciated.

## 2021-06-21 ENCOUNTER — HOSPITAL ENCOUNTER (OUTPATIENT)
Age: 21
Discharge: HOME | End: 2021-06-21
Payer: COMMERCIAL

## 2021-06-21 VITALS
RESPIRATION RATE: 18 BRPM | DIASTOLIC BLOOD PRESSURE: 68 MMHG | HEART RATE: 53 BPM | OXYGEN SATURATION: 98 % | TEMPERATURE: 98 F | SYSTOLIC BLOOD PRESSURE: 120 MMHG

## 2021-06-21 DIAGNOSIS — R53.83 FATIGUE, UNSPECIFIED TYPE: ICD-10-CM

## 2021-06-21 DIAGNOSIS — J02.9 SORE THROAT: Primary | ICD-10-CM

## 2021-06-21 PROCEDURE — 36415 COLL VENOUS BLD VENIPUNCTURE: CPT

## 2021-06-21 PROCEDURE — 86308 HETEROPHILE ANTIBODY SCREEN: CPT | Performed by: NURSE PRACTITIONER

## 2021-06-21 PROCEDURE — 99213 OFFICE O/P EST LOW 20 MIN: CPT

## 2021-06-21 PROCEDURE — 87880 STREP A ASSAY W/OPTIC: CPT

## 2021-06-21 NOTE — ED PROVIDER NOTES
Patient Seen in: [facility] [place]      History   Patient presents with:  Sore Throat    Stated Complaint: sorethroat / fatigue    HPI/Subjective: This is a 76-year-old male with no significant past medical history.   Presents to immediate care except as noted above.     Physical Exam     ED Triage Vitals [06/21/21 1355]   /68   Pulse 53   Resp 18   Temp 98.3 °F (36.8 °C)   Temp src Temporal   SpO2 98 %   O2 Device None (Room air)       Current:/68   Pulse 53   Temp 98.3 °F (36.8 °C) ( refill takes less than 2 seconds. Findings: No rash. Neurological:      Mental Status: He is alert and oriented to person, place, and time.    Psychiatric:         Mood and Affect: Mood normal.         Behavior: Behavior normal.               ED Cour

## 2021-12-27 ENCOUNTER — HOSPITAL ENCOUNTER (OUTPATIENT)
Age: 21
Discharge: HOME | End: 2021-12-27
Attending: EMERGENCY MEDICINE
Payer: COMMERCIAL

## 2021-12-27 VITALS
HEIGHT: 78 IN | RESPIRATION RATE: 16 BRPM | TEMPERATURE: 98 F | SYSTOLIC BLOOD PRESSURE: 132 MMHG | BODY MASS INDEX: 27.77 KG/M2 | WEIGHT: 240 LBS | DIASTOLIC BLOOD PRESSURE: 75 MMHG | HEART RATE: 84 BPM | OXYGEN SATURATION: 98 %

## 2021-12-27 DIAGNOSIS — U07.1 COVID-19 VIRUS INFECTION: Primary | ICD-10-CM

## 2021-12-27 LAB — SARS-COV-2 RNA RESP QL NAA+PROBE: DETECTED

## 2021-12-27 PROCEDURE — 99213 OFFICE O/P EST LOW 20 MIN: CPT

## 2021-12-27 PROCEDURE — 99212 OFFICE O/P EST SF 10 MIN: CPT

## 2021-12-27 NOTE — ED PROVIDER NOTES
Patient Seen in: [facility] [place]      History   Patient presents with:  Cough  Sore Throat    Stated Complaint: RUNNY NOSE ,COUGH,FEVER,HEADACHE,SORE THROAT    Subjective:   HPI    Patient with runny nose cough fever congestion headache sore t Neurological:      Mental Status: He is alert and oriented to person, place, and time. Motor: No abnormal muscle tone.       Coordination: Coordination normal.   Psychiatric:         Behavior: Behavior normal.              ED Course     Labs Reviewed

## 2022-04-04 ENCOUNTER — HOSPITAL ENCOUNTER (OUTPATIENT)
Age: 22
Discharge: HOME | End: 2022-04-04
Payer: COMMERCIAL

## 2022-04-04 VITALS
DIASTOLIC BLOOD PRESSURE: 59 MMHG | RESPIRATION RATE: 14 BRPM | HEART RATE: 79 BPM | SYSTOLIC BLOOD PRESSURE: 111 MMHG | BODY MASS INDEX: 28.93 KG/M2 | WEIGHT: 250 LBS | HEIGHT: 78 IN | TEMPERATURE: 98 F | OXYGEN SATURATION: 98 %

## 2022-04-04 DIAGNOSIS — J02.9 PHARYNGITIS, UNSPECIFIED ETIOLOGY: Primary | ICD-10-CM

## 2022-04-04 LAB
S PYO AG THROAT QL: NEGATIVE
SARS-COV-2 RNA RESP QL NAA+PROBE: NOT DETECTED

## 2022-04-04 PROCEDURE — 99213 OFFICE O/P EST LOW 20 MIN: CPT

## 2022-04-04 PROCEDURE — 87880 STREP A ASSAY W/OPTIC: CPT

## 2022-04-04 PROCEDURE — 87081 CULTURE SCREEN ONLY: CPT | Performed by: NURSE PRACTITIONER

## 2022-04-04 PROCEDURE — 99214 OFFICE O/P EST MOD 30 MIN: CPT

## 2022-04-04 RX ORDER — LIDOCAINE HYDROCHLORIDE 20 MG/ML
5 SOLUTION OROPHARYNGEAL
Qty: 100 ML | Refills: 0 | Status: SHIPPED | OUTPATIENT
Start: 2022-04-04

## 2022-04-04 NOTE — ED INITIAL ASSESSMENT (HPI)
Pt presents today with c/o sore throat, productive cough with yellow sputum, and nasal congestion since Sunday. Pt states that he thinks he has had a fever but is not sure.

## 2022-11-06 ENCOUNTER — HOSPITAL ENCOUNTER (OUTPATIENT)
Age: 22
Discharge: HOME | End: 2022-11-06
Payer: COMMERCIAL

## 2022-11-06 VITALS
WEIGHT: 240 LBS | RESPIRATION RATE: 20 BRPM | BODY MASS INDEX: 27.77 KG/M2 | DIASTOLIC BLOOD PRESSURE: 85 MMHG | SYSTOLIC BLOOD PRESSURE: 137 MMHG | HEIGHT: 78 IN | HEART RATE: 75 BPM | OXYGEN SATURATION: 100 % | TEMPERATURE: 100 F

## 2022-11-06 DIAGNOSIS — J10.1 INFLUENZA A: Primary | ICD-10-CM

## 2022-11-06 LAB
POCT INFLUENZA A: POSITIVE
POCT INFLUENZA B: NEGATIVE
S PYO AG THROAT QL: NEGATIVE
SARS-COV-2 RNA RESP QL NAA+PROBE: NOT DETECTED

## 2022-11-06 PROCEDURE — 99214 OFFICE O/P EST MOD 30 MIN: CPT

## 2022-11-06 PROCEDURE — 87502 INFLUENZA DNA AMP PROBE: CPT | Performed by: PHYSICIAN ASSISTANT

## 2022-11-06 PROCEDURE — 99213 OFFICE O/P EST LOW 20 MIN: CPT

## 2022-11-06 PROCEDURE — 87880 STREP A ASSAY W/OPTIC: CPT

## 2022-11-06 RX ORDER — BENZONATATE 100 MG/1
100 CAPSULE ORAL 3 TIMES DAILY PRN
Qty: 30 CAPSULE | Refills: 0 | Status: SHIPPED | OUTPATIENT
Start: 2022-11-06 | End: 2022-12-06

## 2022-11-06 RX ORDER — ALBUTEROL SULFATE 90 UG/1
2 AEROSOL, METERED RESPIRATORY (INHALATION) EVERY 4 HOURS PRN
Qty: 1 EACH | Refills: 0 | Status: SHIPPED | OUTPATIENT
Start: 2022-11-06 | End: 2022-12-06

## 2022-11-06 RX ORDER — IBUPROFEN 600 MG/1
600 TABLET ORAL ONCE
Status: COMPLETED | OUTPATIENT
Start: 2022-11-06 | End: 2022-11-06

## 2022-11-06 NOTE — DISCHARGE INSTRUCTIONS
Push fluids, rest alternate Tylenol and ibuprofen for fever you must be fever free before returning to work.     If fever lasts longer than 7 days be re-evaluated

## 2023-01-23 RX ORDER — VALACYCLOVIR HYDROCHLORIDE 1 G/1
TABLET, FILM COATED ORAL
Qty: 16 TABLET | Refills: 1 | OUTPATIENT
Start: 2023-01-23

## 2023-03-02 ENCOUNTER — HOSPITAL ENCOUNTER (OUTPATIENT)
Age: 23
Discharge: HOME | End: 2023-03-02
Payer: COMMERCIAL

## 2023-03-02 ENCOUNTER — APPOINTMENT (OUTPATIENT)
Dept: GENERAL RADIOLOGY | Age: 23
End: 2023-03-02
Attending: NURSE PRACTITIONER
Payer: COMMERCIAL

## 2023-03-02 VITALS
BODY MASS INDEX: 28.11 KG/M2 | OXYGEN SATURATION: 97 % | HEIGHT: 78 IN | HEART RATE: 59 BPM | RESPIRATION RATE: 16 BRPM | SYSTOLIC BLOOD PRESSURE: 128 MMHG | WEIGHT: 243 LBS | DIASTOLIC BLOOD PRESSURE: 63 MMHG | TEMPERATURE: 98 F

## 2023-03-02 DIAGNOSIS — G89.29 CHRONIC PAIN OF RIGHT KNEE: ICD-10-CM

## 2023-03-02 DIAGNOSIS — M25.561 CHRONIC PAIN OF RIGHT KNEE: ICD-10-CM

## 2023-03-02 DIAGNOSIS — N39.9 URINARY PROBLEM IN MALE: Primary | ICD-10-CM

## 2023-03-02 LAB
POCT BILIRUBIN URINE: NEGATIVE
POCT BLOOD URINE: NEGATIVE
POCT GLUCOSE URINE: NEGATIVE MG/DL
POCT KETONE URINE: NEGATIVE MG/DL
POCT LEUKOCYTE ESTERASE URINE: NEGATIVE
POCT NITRITE URINE: NEGATIVE
POCT PH URINE: 6.5 (ref 5–8)
POCT PROTEIN URINE: NEGATIVE MG/DL
POCT SPECIFIC GRAVITY URINE: 1.02
POCT URINE CLARITY: CLEAR
POCT URINE COLOR: YELLOW
POCT UROBILINOGEN URINE: 0.2 MG/DL

## 2023-03-02 PROCEDURE — 99213 OFFICE O/P EST LOW 20 MIN: CPT

## 2023-03-02 PROCEDURE — 87491 CHLMYD TRACH DNA AMP PROBE: CPT | Performed by: NURSE PRACTITIONER

## 2023-03-02 PROCEDURE — 87591 N.GONORRHOEAE DNA AMP PROB: CPT | Performed by: NURSE PRACTITIONER

## 2023-03-02 PROCEDURE — 81002 URINALYSIS NONAUTO W/O SCOPE: CPT | Performed by: NURSE PRACTITIONER

## 2023-03-02 PROCEDURE — 73560 X-RAY EXAM OF KNEE 1 OR 2: CPT | Performed by: NURSE PRACTITIONER

## 2023-03-02 PROCEDURE — 99214 OFFICE O/P EST MOD 30 MIN: CPT

## 2023-03-02 NOTE — DISCHARGE INSTRUCTIONS
We will call with your gonorrhea and Chlamydia result. Follow-up with Ortho on your knee pain. You may use a knee sleeve assistive device which you can get over-the-counter.

## 2023-03-02 NOTE — ED INITIAL ASSESSMENT (HPI)
For the past 7 days, patient complains of increased urinary frequency, urgency and dysuria. Denies hematuria or fevers. States the head of penis is more sensitive, burning with ejaculate and urine is cloudy/malodorous. Pt is sexually active and girlfriend recently had a UTI. History of kidney stones. Patient also felt a pop and injured his right knee exercising 1.5 months ago and at work. C/o right knee pain.

## 2023-03-03 LAB
C TRACH DNA SPEC QL NAA+PROBE: NEGATIVE
N GONORRHOEA DNA SPEC QL NAA+PROBE: NEGATIVE

## 2023-04-12 ENCOUNTER — PATIENT MESSAGE (OUTPATIENT)
Dept: FAMILY MEDICINE CLINIC | Facility: CLINIC | Age: 23
End: 2023-04-12

## 2023-04-12 RX ORDER — VALACYCLOVIR HYDROCHLORIDE 1 G/1
TABLET, FILM COATED ORAL
Qty: 20 TABLET | Refills: 0 | Status: SHIPPED | OUTPATIENT
Start: 2023-04-12

## 2023-04-12 NOTE — TELEPHONE ENCOUNTER
From: Cherelle Osei  To:  Keegan Kovacs DO  Sent: 4/12/2023 8:42 AM CDT  Subject: Merle Morales been busy but i plan on coming in for my yearly checkup soon, im going to [employer] for my graduation and will be in [place], can you please refill my prescription thank you I will try to get in before I leave

## 2023-05-25 ENCOUNTER — OFFICE VISIT (OUTPATIENT)
Dept: FAMILY MEDICINE CLINIC | Facility: CLINIC | Age: 23
End: 2023-05-25
Payer: COMMERCIAL

## 2023-05-25 VITALS
WEIGHT: 253 LBS | HEIGHT: 78 IN | DIASTOLIC BLOOD PRESSURE: 72 MMHG | RESPIRATION RATE: 16 BRPM | OXYGEN SATURATION: 98 % | HEART RATE: 66 BPM | SYSTOLIC BLOOD PRESSURE: 102 MMHG | BODY MASS INDEX: 29.27 KG/M2

## 2023-05-25 DIAGNOSIS — Z84.81 FAMILY HISTORY OF BRCA GENE POSITIVE: ICD-10-CM

## 2023-05-25 DIAGNOSIS — Z00.00 ANNUAL PHYSICAL EXAM: Primary | ICD-10-CM

## 2023-05-25 DIAGNOSIS — R19.7 DIARRHEA, UNSPECIFIED TYPE: ICD-10-CM

## 2023-05-25 DIAGNOSIS — Z80.3 FAMILY HISTORY OF BREAST CANCER: ICD-10-CM

## 2023-05-25 DIAGNOSIS — M25.561 ACUTE PAIN OF RIGHT KNEE: ICD-10-CM

## 2023-05-25 PROCEDURE — 99395 PREV VISIT EST AGE 18-39: CPT | Performed by: FAMILY MEDICINE

## 2023-05-25 PROCEDURE — 3078F DIAST BP <80 MM HG: CPT | Performed by: FAMILY MEDICINE

## 2023-05-25 PROCEDURE — 3008F BODY MASS INDEX DOCD: CPT | Performed by: FAMILY MEDICINE

## 2023-05-25 PROCEDURE — 3074F SYST BP LT 130 MM HG: CPT | Performed by: FAMILY MEDICINE

## 2023-05-25 PROCEDURE — 99212 OFFICE O/P EST SF 10 MIN: CPT | Performed by: FAMILY MEDICINE

## 2023-06-08 ENCOUNTER — OFFICE VISIT (OUTPATIENT)
Dept: FAMILY MEDICINE CLINIC | Facility: CLINIC | Age: 23
End: 2023-06-08
Payer: COMMERCIAL

## 2023-06-08 VITALS
RESPIRATION RATE: 20 BRPM | DIASTOLIC BLOOD PRESSURE: 80 MMHG | SYSTOLIC BLOOD PRESSURE: 120 MMHG | BODY MASS INDEX: 29.16 KG/M2 | HEART RATE: 67 BPM | HEIGHT: 78 IN | OXYGEN SATURATION: 98 % | WEIGHT: 252 LBS

## 2023-06-08 DIAGNOSIS — L84 CORN OF FOOT: Primary | ICD-10-CM

## 2023-06-08 DIAGNOSIS — Z86.59: ICD-10-CM

## 2023-06-08 PROCEDURE — 3074F SYST BP LT 130 MM HG: CPT | Performed by: FAMILY MEDICINE

## 2023-06-08 PROCEDURE — 3008F BODY MASS INDEX DOCD: CPT | Performed by: FAMILY MEDICINE

## 2023-06-08 PROCEDURE — 99213 OFFICE O/P EST LOW 20 MIN: CPT | Performed by: FAMILY MEDICINE

## 2023-06-08 PROCEDURE — 3079F DIAST BP 80-89 MM HG: CPT | Performed by: FAMILY MEDICINE

## 2024-02-06 RX ORDER — VALACYCLOVIR HYDROCHLORIDE 1 G/1
TABLET, FILM COATED ORAL
Qty: 20 TABLET | Refills: 0 | Status: SHIPPED | OUTPATIENT
Start: 2024-02-06

## 2024-02-23 ENCOUNTER — APPOINTMENT (OUTPATIENT)
Dept: CT IMAGING | Age: 24
End: 2024-02-23
Attending: EMERGENCY MEDICINE
Payer: COMMERCIAL

## 2024-02-23 ENCOUNTER — HOSPITAL ENCOUNTER (EMERGENCY)
Age: 24
Discharge: HOME | End: 2024-02-23
Attending: EMERGENCY MEDICINE
Payer: COMMERCIAL

## 2024-02-23 VITALS
HEART RATE: 80 BPM | TEMPERATURE: 98 F | RESPIRATION RATE: 18 BRPM | DIASTOLIC BLOOD PRESSURE: 56 MMHG | SYSTOLIC BLOOD PRESSURE: 111 MMHG | BODY MASS INDEX: 29.5 KG/M2 | WEIGHT: 255 LBS | OXYGEN SATURATION: 97 % | HEIGHT: 78 IN

## 2024-02-23 DIAGNOSIS — N20.1 URETEROLITHIASIS: Primary | ICD-10-CM

## 2024-02-23 LAB
ANION GAP SERPL CALC-SCNC: 5 MMOL/L (ref 0–18)
BASOPHILS # BLD AUTO: 0.03 X10(3) UL (ref 0–0.2)
BASOPHILS NFR BLD AUTO: 0.4 %
BUN BLD-MCNC: 18 MG/DL (ref 9–23)
CALCIUM BLD-MCNC: 9.2 MG/DL (ref 8.5–10.1)
CHLORIDE SERPL-SCNC: 109 MMOL/L (ref 98–112)
CLARITY UR REFRACT.AUTO: CLEAR
CO2 SERPL-SCNC: 22 MMOL/L (ref 21–32)
COLOR UR AUTO: YELLOW
CREAT BLD-MCNC: 1.07 MG/DL
EGFRCR SERPLBLD CKD-EPI 2021: 99 ML/MIN/1.73M2 (ref 60–?)
EOSINOPHIL # BLD AUTO: 0.1 X10(3) UL (ref 0–0.7)
EOSINOPHIL NFR BLD AUTO: 1.3 %
ERYTHROCYTE [DISTWIDTH] IN BLOOD BY AUTOMATED COUNT: 12.3 %
GLUCOSE BLD-MCNC: 115 MG/DL (ref 70–99)
GLUCOSE UR STRIP.AUTO-MCNC: NEGATIVE MG/DL
HCT VFR BLD AUTO: 43.4 %
HGB BLD-MCNC: 15.2 G/DL
IMM GRANULOCYTES # BLD AUTO: 0.04 X10(3) UL (ref 0–1)
IMM GRANULOCYTES NFR BLD: 0.5 %
KETONES UR STRIP.AUTO-MCNC: 15 MG/DL
LEUKOCYTE ESTERASE UR QL STRIP.AUTO: NEGATIVE
LYMPHOCYTES # BLD AUTO: 2.87 X10(3) UL (ref 1–4)
LYMPHOCYTES NFR BLD AUTO: 36.3 %
MCH RBC QN AUTO: 29.7 PG (ref 26–34)
MCHC RBC AUTO-ENTMCNC: 35 G/DL (ref 31–37)
MCV RBC AUTO: 84.8 FL
MONOCYTES # BLD AUTO: 0.81 X10(3) UL (ref 0.1–1)
MONOCYTES NFR BLD AUTO: 10.3 %
NEUTROPHILS # BLD AUTO: 4.05 X10 (3) UL (ref 1.5–7.7)
NEUTROPHILS # BLD AUTO: 4.05 X10(3) UL (ref 1.5–7.7)
NEUTROPHILS NFR BLD AUTO: 51.2 %
NITRITE UR QL STRIP.AUTO: NEGATIVE
OSMOLALITY SERPL CALC.SUM OF ELEC: 285 MOSM/KG (ref 275–295)
PH UR STRIP.AUTO: 6 [PH] (ref 5–8)
PLATELET # BLD AUTO: 200 10(3)UL (ref 150–450)
POTASSIUM SERPL-SCNC: 4.9 MMOL/L (ref 3.5–5.1)
RBC # BLD AUTO: 5.12 X10(6)UL
RBC #/AREA URNS AUTO: >10 /HPF
SODIUM SERPL-SCNC: 136 MMOL/L (ref 136–145)
SP GR UR STRIP.AUTO: >=1.03 (ref 1–1.03)
UROBILINOGEN UR STRIP.AUTO-MCNC: 0.2 MG/DL
WBC # BLD AUTO: 7.9 X10(3) UL (ref 4–11)

## 2024-02-23 PROCEDURE — 96375 TX/PRO/DX INJ NEW DRUG ADDON: CPT

## 2024-02-23 PROCEDURE — 74176 CT ABD & PELVIS W/O CONTRAST: CPT | Performed by: EMERGENCY MEDICINE

## 2024-02-23 PROCEDURE — 85025 COMPLETE CBC W/AUTO DIFF WBC: CPT | Performed by: EMERGENCY MEDICINE

## 2024-02-23 PROCEDURE — 81015 MICROSCOPIC EXAM OF URINE: CPT | Performed by: EMERGENCY MEDICINE

## 2024-02-23 PROCEDURE — 81001 URINALYSIS AUTO W/SCOPE: CPT | Performed by: EMERGENCY MEDICINE

## 2024-02-23 PROCEDURE — 99284 EMERGENCY DEPT VISIT MOD MDM: CPT

## 2024-02-23 PROCEDURE — 96374 THER/PROPH/DIAG INJ IV PUSH: CPT

## 2024-02-23 PROCEDURE — 96376 TX/PRO/DX INJ SAME DRUG ADON: CPT

## 2024-02-23 PROCEDURE — 80048 BASIC METABOLIC PNL TOTAL CA: CPT | Performed by: EMERGENCY MEDICINE

## 2024-02-23 RX ORDER — PHENAZOPYRIDINE HYDROCHLORIDE 200 MG/1
200 TABLET, FILM COATED ORAL ONCE
Status: DISCONTINUED | OUTPATIENT
Start: 2024-02-23 | End: 2024-02-23

## 2024-02-23 RX ORDER — HYDROMORPHONE HYDROCHLORIDE 1 MG/ML
0.5 INJECTION, SOLUTION INTRAMUSCULAR; INTRAVENOUS; SUBCUTANEOUS ONCE
Status: COMPLETED | OUTPATIENT
Start: 2024-02-23 | End: 2024-02-23

## 2024-02-23 RX ORDER — TAMSULOSIN HYDROCHLORIDE 0.4 MG/1
0.4 CAPSULE ORAL DAILY
Qty: 7 CAPSULE | Refills: 0 | Status: SHIPPED | OUTPATIENT
Start: 2024-02-23 | End: 2024-03-01

## 2024-02-23 RX ORDER — ONDANSETRON 2 MG/ML
4 INJECTION INTRAMUSCULAR; INTRAVENOUS ONCE
Status: COMPLETED | OUTPATIENT
Start: 2024-02-23 | End: 2024-02-23

## 2024-02-23 RX ORDER — HYDROCODONE BITARTRATE AND ACETAMINOPHEN 5; 325 MG/1; MG/1
1-2 TABLET ORAL EVERY 6 HOURS PRN
Qty: 10 TABLET | Refills: 0 | Status: SHIPPED | OUTPATIENT
Start: 2024-02-23 | End: 2024-02-28

## 2024-02-23 RX ORDER — DOCUSATE SODIUM 100 MG/1
100 CAPSULE, LIQUID FILLED ORAL 2 TIMES DAILY
Qty: 60 CAPSULE | Refills: 0 | Status: SHIPPED | OUTPATIENT
Start: 2024-02-23 | End: 2024-03-24

## 2024-02-23 RX ORDER — TAMSULOSIN HYDROCHLORIDE 0.4 MG/1
0.4 CAPSULE ORAL ONCE
Status: COMPLETED | OUTPATIENT
Start: 2024-02-23 | End: 2024-02-23

## 2024-02-23 RX ORDER — KETOROLAC TROMETHAMINE 30 MG/ML
30 INJECTION, SOLUTION INTRAMUSCULAR; INTRAVENOUS ONCE
Status: COMPLETED | OUTPATIENT
Start: 2024-02-23 | End: 2024-02-23

## 2024-02-23 RX ORDER — ONDANSETRON 4 MG/1
4 TABLET, ORALLY DISINTEGRATING ORAL EVERY 4 HOURS PRN
Qty: 10 TABLET | Refills: 0 | Status: SHIPPED | OUTPATIENT
Start: 2024-02-23 | End: 2024-03-01

## 2024-02-23 RX ORDER — KETOROLAC TROMETHAMINE 10 MG/1
10 TABLET, FILM COATED ORAL EVERY 6 HOURS PRN
Qty: 12 TABLET | Refills: 0 | Status: SHIPPED | OUTPATIENT
Start: 2024-02-23 | End: 2024-02-26

## 2024-02-23 RX ORDER — POLYETHYLENE GLYCOL 3350 17 G/17G
17 POWDER, FOR SOLUTION ORAL DAILY PRN
Qty: 12 EACH | Refills: 0 | Status: SHIPPED | OUTPATIENT
Start: 2024-02-23 | End: 2024-03-24

## 2024-02-23 NOTE — ED PROVIDER NOTES
Patient Seen in: ward Emergency Department [facility] [address]      History     Chief Complaint   Patient presents with    Abdomen/Flank Pain     Stated Complaint: right flank pain h/o kidney stones    Subjective:   HPI    24 yo male with c/o kidney stones with c/o R flank pain. Felt constipated x 2 days. Urinary urgency \"for a while.\" Dysuria intermittent for weeks. Fever/chills since last night.     Objective:   Past Medical History:   Diagnosis Date    ADD (attention deficit disorder)     Kawasaki disease (HCC)     unspecified    Kidney stones               History reviewed. No pertinent surgical history.             Social History     Socioeconomic History    Marital status: Single   Tobacco Use    Smoking status: Never     Passive exposure: Never    Smokeless tobacco: Never   Vaping Use    Vaping Use: Never used   Substance and Sexual Activity    Alcohol use: Yes    Drug use: Never              Review of Systems    Positive for stated complaint: right flank pain h/o kidney stones  Other systems are as noted in HPI.  Constitutional and vital signs reviewed.      All other systems reviewed and negative except as noted above.    Physical Exam     ED Triage Vitals [02/23/24 0115]   /83   Pulse (!) 48   Resp 20   Temp 97.6 °F (36.4 °C)   Temp src Oral   SpO2 98 %   O2 Device None (Room air)       Current:/56   Pulse 80   Temp 97.6 °F (36.4 °C) (Oral)   Resp 18   Ht 200.7 cm (6' 7\")   Wt 115.7 kg   SpO2 97%   BMI 28.73 kg/m²         Physical Exam  Vitals and nursing note reviewed.   Constitutional:       Appearance: He is well-developed.   HENT:      Head: Normocephalic and atraumatic.   Eyes:      Pupils: Pupils are equal, round, and reactive to light.   Cardiovascular:      Rate and Rhythm: Normal rate and regular rhythm.   Pulmonary:      Effort: Pulmonary effort is normal.      Breath sounds: Normal breath sounds.   Abdominal:      General: Bowel sounds are normal.      Palpations: Abdomen is  soft.   Musculoskeletal:         General: No deformity.      Cervical back: Normal range of motion and neck supple.   Skin:     General: Skin is warm and dry.      Capillary Refill: Capillary refill takes less than 2 seconds.   Neurological:      Mental Status: He is alert and oriented to person, place, and time.               ED Course     Labs Reviewed   URINALYSIS, ROUTINE - Abnormal; Notable for the following components:       Result Value    Bilirubin Urine Small (*)     Ketones Urine 15 (*)     Blood Urine Large (*)     Protein Urine 100 mg/dL (*)     All other components within normal limits   BASIC METABOLIC PANEL (8) - Abnormal; Notable for the following components:    Glucose 115 (*)     All other components within normal limits   UA MICROSCOPIC ONLY, URINE - Abnormal; Notable for the following components:    RBC Urine >10 (*)     All other components within normal limits   CBC WITH DIFFERENTIAL WITH PLATELET    Narrative:     The following orders were created for panel order CBC With Differential With Platelet.  Procedure                               Abnormality         Status                     ---------                               -----------         ------                     CBC W/ DIFFERENTIAL[[id number]]                              Final result                 Please view results for these tests on the individual orders.   CBC W/ DIFFERENTIAL               Additional Information (per [facility]):    CT abdomen and pelvis without contrast    IMPRESSION:    Obstructing 5 mm calculus in the distal right ureter with associated moderate right hydroureteronephrosis.  No additional nephrolithiasis.    Gallbladder and pancreas are unremarkable.  Appendix is normal.  No bowel obstruction or focal inflammation           MDM      This is a 23-year-old male who presents ED with complaints of right flank pain.  Vital signs stable arrival patient appears nontoxic examination.   differential diagnosis  of UTI versus ureterolithiasis versus musculoskeletal pain.  CT abdomen pelvis shows a 5 mm calculus distal right ureter.  Urinalysis clean for signs of infection.  Patient was given Dilaudid Toradol fluids Zofran and Flomax for pain control and is feeling improved.  Discussed supportive care close follow-up strict return precautions.  Patient understand the plan and is in agreement plan discharged home stable condition.                                   Medical Decision Making      Disposition and Plan     Clinical Impression:  1. Ureterolithiasis         Disposition:  Discharge  2/23/2024  3:19 am    Follow-up:  Jayesh Varma MD  [address]  [address]  [place] [id number]  [phone]    Call in 1 day(s)            Medications Prescribed:  Discharge Medication List as of 2/23/2024  3:28 AM        START taking these medications    Details   HYDROcodone-acetaminophen 5-325 MG Oral Tab Take 1-2 tablets by mouth every 6 (six) hours as needed for Pain., Normal, Disp-10 tablet, R-0      tamsulosin (FLOMAX) 0.4 MG Oral Cap Take 1 capsule (0.4 mg total) by mouth daily for 7 days., Normal, Disp-7 capsule, R-0      ondansetron 4 MG Oral Tablet Dispersible Take 1 tablet (4 mg total) by mouth every 4 (four) hours as needed for Nausea., Normal, Disp-10 tablet, R-0      polyethylene glycol, PEG 3350, 17 g Oral Powd Pack Take 17 g by mouth daily as needed., Normal, Disp-12 each, R-0      docusate sodium 100 MG Oral Cap Take 1 capsule (100 mg total) by mouth 2 (two) times daily., Normal, Disp-60 capsule, R-0      Ketorolac Tromethamine 10 MG Oral Tab Take 1 tablet (10 mg total) by mouth every 6 (six) hours as needed for Pain., Normal, Disp-12 tablet, R-0

## 2024-02-23 NOTE — ED INITIAL ASSESSMENT (HPI)
Pt in er for c/o pain to his right flank since yesterday reports worsening pain tonight around 2245

## 2024-02-23 NOTE — DISCHARGE INSTRUCTIONS
Please follow-up with your primary care physician 1-2 days return to the ER if your symptoms worsen progress or if you have any further concerns.  Please follow-up with Dr. Varma within the week.  Please strain your urine to be passed kidney stone.  Drink at least 80 ounces of fluids daily.    Take ketorolac as needed for severe pain.  Your pain is adequately controlled take Norco.  Do not take Norco and drive or operate heavy machinery as it will make you drowsy.  Take Flomax daily till you passed your kidney stone.    Take MiraLAX as well as docusate daily until constipation resolves.

## 2024-03-26 RX ORDER — VALACYCLOVIR HYDROCHLORIDE 1 G/1
TABLET, FILM COATED ORAL
Qty: 20 TABLET | Refills: 0 | Status: SHIPPED | OUTPATIENT
Start: 2024-03-26

## 2024-04-03 ENCOUNTER — HOSPITAL ENCOUNTER (EMERGENCY)
Age: 24
Discharge: HOME | End: 2024-04-03
Attending: EMERGENCY MEDICINE
Payer: COMMERCIAL

## 2024-04-03 ENCOUNTER — APPOINTMENT (OUTPATIENT)
Dept: CT IMAGING | Age: 24
End: 2024-04-03
Attending: EMERGENCY MEDICINE
Payer: COMMERCIAL

## 2024-04-03 VITALS
HEART RATE: 55 BPM | WEIGHT: 255 LBS | RESPIRATION RATE: 16 BRPM | DIASTOLIC BLOOD PRESSURE: 79 MMHG | HEIGHT: 78 IN | TEMPERATURE: 98 F | SYSTOLIC BLOOD PRESSURE: 135 MMHG | BODY MASS INDEX: 29.5 KG/M2 | OXYGEN SATURATION: 98 %

## 2024-04-03 DIAGNOSIS — N23 RENAL COLIC: ICD-10-CM

## 2024-04-03 DIAGNOSIS — N20.0 KIDNEY STONE: Primary | ICD-10-CM

## 2024-04-03 LAB
ALBUMIN SERPL-MCNC: 3.7 G/DL (ref 3.4–5)
ALBUMIN/GLOB SERPL: 1.1 {RATIO} (ref 1–2)
ALP LIVER SERPL-CCNC: 53 U/L
ALT SERPL-CCNC: 31 U/L
ANION GAP SERPL CALC-SCNC: 4 MMOL/L (ref 0–18)
AST SERPL-CCNC: 16 U/L (ref 15–37)
BASOPHILS # BLD AUTO: 0.03 X10(3) UL (ref 0–0.2)
BASOPHILS NFR BLD AUTO: 0.3 %
BILIRUB SERPL-MCNC: 0.7 MG/DL (ref 0.1–2)
BILIRUB UR QL STRIP.AUTO: NEGATIVE
BUN BLD-MCNC: 19 MG/DL (ref 9–23)
CALCIUM BLD-MCNC: 9.4 MG/DL (ref 8.5–10.1)
CHLORIDE SERPL-SCNC: 106 MMOL/L (ref 98–112)
CLARITY UR REFRACT.AUTO: CLEAR
CO2 SERPL-SCNC: 28 MMOL/L (ref 21–32)
COLOR UR AUTO: YELLOW
CREAT BLD-MCNC: 0.98 MG/DL
EGFRCR SERPLBLD CKD-EPI 2021: 110 ML/MIN/1.73M2 (ref 60–?)
EOSINOPHIL # BLD AUTO: 0.14 X10(3) UL (ref 0–0.7)
EOSINOPHIL NFR BLD AUTO: 1.6 %
ERYTHROCYTE [DISTWIDTH] IN BLOOD BY AUTOMATED COUNT: 12.2 %
GLOBULIN PLAS-MCNC: 3.5 G/DL (ref 2.8–4.4)
GLUCOSE BLD-MCNC: 112 MG/DL (ref 70–99)
GLUCOSE UR STRIP.AUTO-MCNC: NEGATIVE MG/DL
HCT VFR BLD AUTO: 40.4 %
HGB BLD-MCNC: 13.8 G/DL
IMM GRANULOCYTES # BLD AUTO: 0.06 X10(3) UL (ref 0–1)
IMM GRANULOCYTES NFR BLD: 0.7 %
LEUKOCYTE ESTERASE UR QL STRIP.AUTO: NEGATIVE
LYMPHOCYTES # BLD AUTO: 2.39 X10(3) UL (ref 1–4)
LYMPHOCYTES NFR BLD AUTO: 26.6 %
MCH RBC QN AUTO: 29.3 PG (ref 26–34)
MCHC RBC AUTO-ENTMCNC: 34.2 G/DL (ref 31–37)
MCV RBC AUTO: 85.8 FL
MONOCYTES # BLD AUTO: 0.95 X10(3) UL (ref 0.1–1)
MONOCYTES NFR BLD AUTO: 10.6 %
NEUTROPHILS # BLD AUTO: 5.4 X10 (3) UL (ref 1.5–7.7)
NEUTROPHILS # BLD AUTO: 5.4 X10(3) UL (ref 1.5–7.7)
NEUTROPHILS NFR BLD AUTO: 60.2 %
NITRITE UR QL STRIP.AUTO: NEGATIVE
OSMOLALITY SERPL CALC.SUM OF ELEC: 289 MOSM/KG (ref 275–295)
PH UR STRIP.AUTO: 5.5 [PH] (ref 5–8)
PLATELET # BLD AUTO: 192 10(3)UL (ref 150–450)
POTASSIUM SERPL-SCNC: 3.6 MMOL/L (ref 3.5–5.1)
PROT SERPL-MCNC: 7.2 G/DL (ref 6.4–8.2)
PROT UR STRIP.AUTO-MCNC: NEGATIVE MG/DL
RBC # BLD AUTO: 4.71 X10(6)UL
SODIUM SERPL-SCNC: 138 MMOL/L (ref 136–145)
SP GR UR STRIP.AUTO: >=1.03 (ref 1–1.03)
UROBILINOGEN UR STRIP.AUTO-MCNC: 0.2 MG/DL
WBC # BLD AUTO: 9 X10(3) UL (ref 4–11)

## 2024-04-03 PROCEDURE — 81015 MICROSCOPIC EXAM OF URINE: CPT | Performed by: EMERGENCY MEDICINE

## 2024-04-03 PROCEDURE — 99285 EMERGENCY DEPT VISIT HI MDM: CPT

## 2024-04-03 PROCEDURE — 96361 HYDRATE IV INFUSION ADD-ON: CPT

## 2024-04-03 PROCEDURE — 85025 COMPLETE CBC W/AUTO DIFF WBC: CPT | Performed by: EMERGENCY MEDICINE

## 2024-04-03 PROCEDURE — 74176 CT ABD & PELVIS W/O CONTRAST: CPT | Performed by: EMERGENCY MEDICINE

## 2024-04-03 PROCEDURE — 96375 TX/PRO/DX INJ NEW DRUG ADDON: CPT

## 2024-04-03 PROCEDURE — 81001 URINALYSIS AUTO W/SCOPE: CPT | Performed by: EMERGENCY MEDICINE

## 2024-04-03 PROCEDURE — 96374 THER/PROPH/DIAG INJ IV PUSH: CPT

## 2024-04-03 PROCEDURE — 99284 EMERGENCY DEPT VISIT MOD MDM: CPT

## 2024-04-03 PROCEDURE — 80053 COMPREHEN METABOLIC PANEL: CPT | Performed by: EMERGENCY MEDICINE

## 2024-04-03 RX ORDER — ONDANSETRON 2 MG/ML
4 INJECTION INTRAMUSCULAR; INTRAVENOUS ONCE
Status: COMPLETED | OUTPATIENT
Start: 2024-04-03 | End: 2024-04-03

## 2024-04-03 RX ORDER — HYDROCODONE BITARTRATE AND ACETAMINOPHEN 5; 325 MG/1; MG/1
1-2 TABLET ORAL EVERY 4 HOURS PRN
Qty: 20 TABLET | Refills: 0 | Status: SHIPPED | OUTPATIENT
Start: 2024-04-03 | End: 2024-04-10

## 2024-04-03 RX ORDER — ONDANSETRON 4 MG/1
4 TABLET, ORALLY DISINTEGRATING ORAL EVERY 4 HOURS PRN
Qty: 10 TABLET | Refills: 0 | Status: SHIPPED | OUTPATIENT
Start: 2024-04-03 | End: 2024-04-10

## 2024-04-03 RX ORDER — KETOROLAC TROMETHAMINE 15 MG/ML
15 INJECTION, SOLUTION INTRAMUSCULAR; INTRAVENOUS ONCE
Status: COMPLETED | OUTPATIENT
Start: 2024-04-03 | End: 2024-04-03

## 2024-04-03 RX ORDER — TAMSULOSIN HYDROCHLORIDE 0.4 MG/1
0.4 CAPSULE ORAL DAILY
Qty: 7 CAPSULE | Refills: 0 | Status: SHIPPED | OUTPATIENT
Start: 2024-04-03 | End: 2024-05-03

## 2024-04-03 NOTE — ED INITIAL ASSESSMENT (HPI)
Pt to ed with c/o rt flank pain that started an hour ago. Reports nausea and vomiting. Hx of kidney stones.

## 2024-04-03 NOTE — ED PROVIDER NOTES
Patient Seen in: [facility] In [place]      History     Chief Complaint   Patient presents with    Abdomen/Flank Pain     Stated Complaint: flank pain, right side, hx of kidney stones    Subjective:   HPI    Patient is a 23-year-old male presents emergency department with right flank pain that started an hour ago.  Does have a history of kidney stones had one a few months ago.  Not sure if he ever passed it.  Did have some nausea and vomiting.  Rated the pain 8 out of 10.  No other complaints.    Objective:   Past Medical History:   Diagnosis Date    ADD (attention deficit disorder)     Kawasaki disease (HCC)     unspecified    Kidney stones               History reviewed. No pertinent surgical history.             Social History     Socioeconomic History    Marital status: Single   Tobacco Use    Smoking status: Never     Passive exposure: Never    Smokeless tobacco: Never   Vaping Use    Vaping Use: Never used   Substance and Sexual Activity    Alcohol use: Yes    Drug use: Never              Review of Systems    Positive for stated complaint: flank pain, right side, hx of kidney stones  Other systems are as noted in HPI.  Constitutional and vital signs reviewed.      All other systems reviewed and negative except as noted above.    Physical Exam     ED Triage Vitals   BP 04/03/24 0049 133/84   Pulse 04/03/24 0049 56   Resp 04/03/24 0049 16   Temp 04/03/24 0049 97.6 °F (36.4 °C)   Temp src 04/03/24 0049 Oral   SpO2 04/03/24 0049 96 %   O2 Device 04/03/24 0244 None (Room air)       Current:/79   Pulse 55   Temp 97.6 °F (36.4 °C) (Oral)   Resp 16   Ht 200.7 cm (6' 7\")   Wt 115.7 kg   SpO2 98%   BMI 28.73 kg/m²         Physical Exam      Vital signs reviewed  General appearance: Patient is alert and in moderate pain distress  HEENT: Pupils equal react to light extraocular muscles intact no scleral icterus, mucous membranes are moist, there is no erythema or exudate in the posterior  pharynx  Neck: Supple no JVD no lymphadenopathy no meningismus no carotid bruit  CV: Regular rate and rhythm no murmur rub  Respiratory: Clear to auscultation bilaterally no crackles no wheezes no accessory muscle use  Abdomen: Soft nontender nondistended, no rebound no guarding  no hepatosplenomegaly bowel sounds are present , no pulsatile mass, patient does have some right-sided CVA tenderness to percussion  Extremities: No clubbing cyanosis or edema 2+ distal pulses.  Neuro: Cranial nerves II through XII intact with no gross focal sensory or motor abnormality.      ED Course     Labs Reviewed   COMP METABOLIC PANEL (14) - Abnormal; Notable for the following components:       Result Value    Glucose 112 (*)     All other components within normal limits   URINALYSIS, ROUTINE - Abnormal; Notable for the following components:    Ketones Urine Trace (*)     Blood Urine Moderate (*)     RBC Urine 6-10 (*)     Squamous Epi. Cells Few (*)     All other components within normal limits   UA MICROSCOPIC ONLY, URINE - Abnormal; Notable for the following components:    RBC Urine 6-10 (*)     Squamous Epi. Cells Few (*)     All other components within normal limits   CBC WITH DIFFERENTIAL WITH PLATELET    Narrative:     The following orders were created for panel order CBC With Differential With Platelet.  Procedure                               Abnormality         Status                     ---------                               -----------         ------                     CBC W/ DIFFERENTIAL[[id number]]                              Final result                 Please view results for these tests on the individual orders.   CBC W/ DIFFERENTIAL             Was evaluated had a CBC chemistry and urinalysis.  Urinalysis did show moderate blood.  Will get a repeat CT scan to see if the stone is still present or if he has not passed it.  Will give him some pain medication and IV fluids.       CT abdomen pelvis  noncontrast      IMPRESSION:   3.5 mm calculus distal right ureter series 2 image 153  Mild-moderate right hydroureter, mild right hydronephrosis  Tiny nonobstructing left renal pelvis calculus series 2 image 56, no left hydroureter or hydronephrosis  Urinary bladder is decompressed, no stones  Normal appendix  Discoid atelectasis and/or fibrosis posterior basal left lower lobe    Patient does appear to have a 3.5 mm calculus in the distal right ureter.  There is some moderate right hydro and hydronephrosis.  Patient will be prescribed Flomax Zofran and Norco.  Drink plenty of fluids strain his urine.  Patient should follow-up with urology to have further workup and possibly have lithotripsy but hopefully the stone will pass.  Patient agrees with plan he was feeling comfortable enough to go home.    MDM      GPT  Medical Decision-Making (MDM):    Differential Diagnosis:Ureteral Calculus: The patient's symptoms of acute right flank pain, history of kidney stones, and findings of hematuria strongly suggest a ureteral stone. The CT scan confirms the presence of a 3.5 mm calculus in the distal right ureter with mild-moderate hydroureter and mild right hydronephrosis, consistent with obstruction and renal colic.    Renal Colic: This diagnosis is supported by the acute onset of flank pain radiating to the groin, a common presentation of renal colic due to obstruction of the urinary tract by a stone, and corroborated by imaging findings.    Pyelonephritis: Although the patient presents with flank pain and hematuria, the absence of fever, pyuria (beyond RBCs), and the specific urinalysis findings make pyelonephritis less likely. The clinical presentation focuses more on obstruction and pain rather than infection.    Nephrolithiasis with Hydronephrosis: The CT findings of a kidney stone with associated hydroureter and hydronephrosis indicate nephrolithiasis with complicating obstruction, leading to renal  colic.    Comorbidities Adding Complexity:ADD (Attention Deficit Disorder): The presence of ADD may influence the patient's perception and reporting of symptoms but does not directly impact the management of the current urinary condition.      My Independent Interpretation of Studies of:CT Abdomen Pelvis Noncontrast: Confirmed a 3.5 mm calculus in the distal right ureter with mild-moderate hydroureter and mild hydronephrosis. Also noted was a tiny nonobstructing left renal pelvis calculus.         Shared Decision Making Done By:The patient was involved in shared decision-making regarding his treatment options, including medication for pain and nausea, advice on fluid intake, and the potential need for follow-up treatment, such as lithotripsy if the stone does not pass spontaneously.    Clinical Impression:Primary: Kidney stone with renal colic due to a 3.5 mm calculus in the distal right ureter, causing mild-moderate hydroureter and mild hydronephrosis.  Secondary: Presence of a tiny nonobstructing left renal pelvis calculus.  Disposition:  Discharged with outpatient follow-up instructions.    Follow-Up:    The patient is advised to follow up with Dr. Maris GIORDANO for further evaluation and management, including potential lithotripsy if the stone does not pass with conservative management.  Medications prescribed include Flomax to facilitate stone passage, Zofran for nausea, and Norco for pain management.  The patient is instructed to drink plenty of fluids, strain his urine, and monitor for the passage of the stone.        Patient was screened and evaluated during this visit.  As the treating physician attending to the patient, I determined within reasonable clinical confidence and prior to discharge, that an emergency medical condition was not or was no longer present.  There was no indication for further evaluation, treatment, or admission on an emergency basis.  Comprehensive verbal and written discharge and  follow-up instructions were provided to help prevent relapse or worsening.  Patient was instructed to follow-up with primary care provider for further evaluation treatment, return immediately to ER for worsening, concerning, new, or changing/persisting symptoms.  I discussed the case with the patient and they had no questions, complaints, or concerns.  Patient was comfortable going home.      Dictation Disclaimer Note:   To increase efficiency this document may have been prepared using voice recognition technology. Every effort has been made to correct any errors made during preparation of this note. However, if a word or phrase is confusing, or does not make sense, this is likely due to a recognition error within the program which was not discovered during editing. Please do not hesitate to contact to address any significant errors.    Note to Patient:   The 21st Century Cures Act makes medical notes like these available to patients in the interest of transparency. Please be advised this is a medical document. Medical documents are intended to carry relevant information, facts as evident, and the clinical opinion of the practitioner. The medical note is intended as peer to peer communication and may appear blunt or direct. It is written in medical language and may contain abbreviations or verbiage that are unfamiliar.                                  Medical Decision Making      Disposition and Plan     Clinical Impression:  1. Kidney stone    2. Renal colic         Disposition:  Discharge  4/3/2024  4:19 am    Follow-up:  Bk Pollock MD  [address] [place] [address] [place]  [place] [address]  [phone]    Follow up            Medications Prescribed:  Discharge Medication List as of 4/3/2024  4:26 AM

## 2024-04-09 ENCOUNTER — OFFICE VISIT (OUTPATIENT)
Dept: SURGERY | Facility: CLINIC | Age: 24
End: 2024-04-09

## 2024-04-09 DIAGNOSIS — R31.29 MICROHEMATURIA: ICD-10-CM

## 2024-04-09 DIAGNOSIS — N20.1 URETERAL STONE: Primary | ICD-10-CM

## 2024-04-09 DIAGNOSIS — N20.0 NEPHROLITHIASIS: ICD-10-CM

## 2024-04-09 DIAGNOSIS — R82.90 URINE FINDING: ICD-10-CM

## 2024-04-09 LAB
APPEARANCE: CLEAR
BILIRUBIN: NEGATIVE
GLUCOSE (URINE DIPSTICK): NEGATIVE MG/DL
KETONES (URINE DIPSTICK): NEGATIVE MG/DL
LEUKOCYTES: NEGATIVE
MULTISTIX LOT#: ABNORMAL NUMERIC
NITRITE, URINE: NEGATIVE
PH, URINE: 7 (ref 4.5–8)
PROTEIN (URINE DIPSTICK): 30 MG/DL
SPECIFIC GRAVITY: 1.02 (ref 1–1.03)
URINE-COLOR: YELLOW
UROBILINOGEN,SEMI-QN: 0.2 MG/DL (ref 0–1.9)

## 2024-04-09 PROCEDURE — 99204 OFFICE O/P NEW MOD 45 MIN: CPT | Performed by: PHYSICIAN ASSISTANT

## 2024-04-09 PROCEDURE — 81003 URINALYSIS AUTO W/O SCOPE: CPT | Performed by: PHYSICIAN ASSISTANT

## 2024-04-09 NOTE — PROGRESS NOTES
[facility], [facility]    Urology Consult Note    History of Present Illness:   Patient is a 23 year old male with hx of ADD, who presents today for consultation from Dr. Field's office for ureteral stone.     Diagnosed with first stone in 2018. Spontaneous passage suspected but no collected stones historically. No prior surgical intervention. No prior  evaluation.    Patient presented to the ED 2/23/24 for right flank pain and some intermittent dysuria. Labs were unremarkable. CT scan with 6mm distal right ureteral stone with mild hydronephrosis, additional non obstructing stones in the left kidney. Discharged on tamsulosin. No passage. Returned to the ED 4/3/24 for recurrence of right flank pain with associated nausea/vomiting. Labs again unremarkable. Repeat CT scan obtained that showed persistent right distal ureteral stone with additional stones bilaterally. Discharged with instruction to follow up with urology.     Has not passed and continues to have intermittent pain and dysuria. No gross hematuria. No fevers.      Historically had high salt diet and high coke consumption. Now has decreased significantly, now primarily water intake. Both parents with stones.     HISTORY:  Past Medical History:   Diagnosis Date    ADD (attention deficit disorder)     Kawasaki disease (HCC)     unspecified    Kidney stones       No past surgical history on file.   Family History   Problem Relation Age of Onset    Cancer Maternal Grandfather         prostate    Cancer Other     Breast Cancer Mother 49    Breast Cancer Maternal Grandmother 72    Heart Disease Neg     Stroke Neg       Social History:   Social History     Socioeconomic History    Marital status: Single   Tobacco Use    Smoking status: Never     Passive exposure: Never    Smokeless tobacco: Never   Vaping Use    Vaping Use: Never used   Substance and Sexual Activity    Alcohol use: Yes    Drug use: Never         Allergies  Allergies   Allergen Reactions    Erythromycin        Review of Systems:   A 10-point review of systems was completed and is negative other than as noted above.    Physical Exam:   There were no vitals taken for this visit.    GENERAL APPEARANCE: well developed, well nourished, in no acute distress  NEUROLOGIC: no localizing neurologic signs, alert and oriented x 3, converses appropriately  HEAD: atraumatic, normocephalic  EYES: sclera non-icteric  ORAL CAVITY: mucosa moist  NECK/THYROID: no obvious masses or goiter  LUNGS: non-labored breathing  ABDOMEN: soft, nontender, non distended  No CVAT  EXTREMITIES: warm, well-perfused. No clubbing, cyanosis or edema.  SKIN: no obvious rashes    Results:     Laboratory Data:  Lab Results   Component Value Date    WBC 9.0 04/03/2024    HGB 13.8 04/03/2024    .0 04/03/2024     Lab Results   Component Value Date     04/03/2024    K 3.6 04/03/2024     04/03/2024    CO2 28.0 04/03/2024    BUN 19 04/03/2024     (H) 04/03/2024    GFRAA 107 07/11/2020    AST 16 04/03/2024    ALT 31 04/03/2024    TP 7.2 04/03/2024    ALB 3.7 04/03/2024    CA 9.4 04/03/2024       Urinalysis Results (last three years):  Recent Labs     03/02/23  1308 02/23/24  0141 04/03/24  0213   COLORUR  --  Yellow Yellow   CLARITY  --  Clear Clear   SPECGRAVITY 1.025 >=1.030 >=1.030   PHURINE  --  6.0 5.5   PROUR  --  100 mg/dL* Negative   GLUUR Negative Negative Negative   KETUR  --  15* Trace*   BILUR  --  Small* Negative   BLOODURINE  --  Large* Moderate*   NITRITE  --  Negative Negative   UROBILINOGEN  --  0.2 0.2   LEUUR  --  Negative Negative   WBCUR  --  1-5 1-5  1-5   RBCUR  --  >10* 6-10*  6-10*   BACUR  --  None Seen None Seen  None Seen       Urine Culture Results (last three years):  Lab Results   Component Value Date    URINECUL No Growth at 18-24 hrs. 07/11/2020       Imaging  CT ABDOMEN+PELVIS KIDNEYSTONE 2D RNDR(NO IV,NO ORAL)(CPT=74176)    Result Date:  4/3/2024  PROCEDURE:  CT ABDOMEN+PELVIS KIDNEYSTONE 2D RNDR(NO IV,NO ORAL)(CPT=74176)  COMPARISON:  [facility], CT, CT ABDOMEN+PELVIS KIDNEYSTONE 2D RNDR(NO IV,NO ORAL)(CPT=74176), 2/23/2024, 1:43 AM.  INDICATIONS:  flank pain, right side, hx of kidney stones  TECHNIQUE:  Unenhanced multislice CT scanning from above the kidneys to below the urinary bladder.  2D rendering are generated on the CT scanner workstation to localize potential stones in the cranio-caudal plane.  Dose reduction techniques were used. Dose information is transmitted to the ACR (American College of Radiology) NRDR (National Radiology Data Registry) which includes the Dose Index Registry.  PATIENT STATED HISTORY: (As transcribed by Technologist)  flank pain, right side, hx of kidney stones.  A preliminary radiology report was created by the [facility] radiology service.  This report was sent to the emergency department.  The report was reviewed prior to this dictation.    FINDINGS:  KIDNEYS:  There is mild right hydronephrosis and right hydroureter.  There is an obstructing 6 mm calculus seen in the distal right ureter located approximately a cm from the right ureteral vesicle junction.  Minimal perinephric stranding noted.  There are 2 small calculi also seen within the right kidney in the lower pole measuring between 1 and 2 mm in diameter.  There also at least 2 nonobstructing left renal calculi identified measuring 2 mm and 1 mm both located in the midpole.  No left ureteral calculi.  No left hydronephrosis or left hydroureter. BLADDER:  No mass, calculus or significant wall thickening. ADRENALS:  No mass or enlargement.  LIVER:  No enlargement, atrophy, abnormal density, or significant focal lesion.  BILIARY:  No visible dilatation or calcification.  PANCREAS:  No lesion, fluid collection, ductal dilatation, or atrophy.  SPLEEN:  Mild splenomegaly.  The spleen measures 15.2 x 9.4 x 6.5 cm. AORTA/VASCULAR:  No aneurysm.  RETROPERITONEUM:  No  mass or adenopathy.  BOWEL/MESENTERY:  No visible mass, obstruction, or bowel wall thickening.  ABDOMINAL WALL:  Small fat containing umbilical hernia noted and small bilateral fat containing inguinal hernias are noted.  No evidence for bowel herniation. BONES:  No bony lesion or fracture. PELVIC ORGANS:  Normal for age.  LUNG BASES:  No visible pulmonary or pleural disease.  OTHER:  Negative.             CONCLUSION:  1. There is an obstructing 6 mm calculus in the distal right ureter located in the same location as seen on the previous exam from 2/23/2024.  There is mild resulting right hydronephrosis or right hydroureter.  Additional nonobstructing bilateral renal calculi are noted. 2. Mild splenomegaly. 3. Small fat containing umbilical hernia and small bilateral fat containing inguinal hernias are noted.   Results were discussed with Dr. Lund at 0544 hours on 4/3/2024 with appropriate read back.    LOCATION:  [facility]   Dictated by (CST): Andrew Barraza MD on 4/03/2024 at 4:16 AM     Finalized by (CST): Andrew Barraza MD on 4/03/2024 at 5:44 AM           Impression:     Patient is a 23 year old male with hx of ADD, who presents today for consultation from Dr. Field's office for ureteral stone.     Reviewed CT scan images with patient   Surgical risks, benefits and alternatives discussed.    Risks of ureteroscopy including but not limited to infection, bleeding, anesthetic issues, possible need for stent, need for subsequent removal of stent, ureteral spasm, ureteral injury or stricture discussed with patient.     We discussed stone prevention strategies at today's visit and I provided and reviewed educational materials for this. I recommend drinking at least 40-60 ounces of water per day or enough water to keep urine clear. I also recommend the patient avoid a high sodium diet. Unable to tolerate citrate. Future 24 hour urine collection recommended.    Recommendations:  Send urine for culture  Patient  scheduled for cystoscopy, right ureteroscopy, stone extraction, possible stent placement and possible laser lithotripsy for next Thursday 4/18/24 with Dr. Bret Carlson.     Thank you very much for this consult. Please call if there are any questions or concerns.     Mary Cristina PA-C  Urology  [facility]    Date: 4/9/2024

## 2024-04-09 NOTE — PATIENT INSTRUCTIONS
General Recommendations to Avoid Future Kidney Stones    1. Drink enough water to produce 2 liters of clear urine daily. You may need to use a container at first to measure how much you are actually producing and increase your intake accordingly. Try to start the day off with a large glass of water as we all wake up dehydrated in the morning.    2. Add lemon or lime juice to your water. These juices contain citrate which naturally inhibits stone formation. An easy way to do this is using sugar free lemonade mix (ie Crystal Light or True Lemon (if you prefer to avoid aspartame))    3. Avoid salty foods such as prepackaged and fast foods, and do not add salt to foods. Try to limit sodium intake to 2500mg maximum.     4. Decrease phosphorus (soda) and caffeine.     5. Limit intake of the following group of foods to one serving daily: spinach, nuts (especially almonds), tea (especially black tea), chocolate, and potatoes.    6. Limit intake of Vitamin C supplements to less than 1000 mg daily.    7. Limit intake of animal protein (beef, chicken, turkey, fish, pork) to one serving daily. A good rule for portion size is no more meat than will fit in the palm of your hand.    8. Limit roasted nuts to 2-3 servings per week.    9.  Maintain 1-2 servings of dairy products daily (1 serving = 1 glass of milk, 2 slices of cheese, 1 scoop of ice cream, or 1 cup of yogurt). Try to decrease cheese intake as it may increase kidney stone risk compared to other dairy products. Do not eliminate calcium from your diet as it is necessary for bone health.   -Females should try not to exceed 500mg per day  -Males should try not to exceed 750mg per day    10. Take a daily B complex vitamin or 100mg vitamin B6 daily. This may help to decrease oxalate in the urine.     11. Take a daily magnesium supplement of 300mg daily. Magnesium binds to oxalate in the same way that calcium does but is more soluble.  Magnesium will take the place of calcium  when binding with oxalate and be less prone to form stones.    12. Eat a low fat diet and exercise at least 30 minutes 3 times per week to try and maintain your ideal body weight. Being overweight is a risk factor for kidney stones too!             Patient Instructions for  24 Hour Urine Kidney Stone Collection System     Supplies:   24-hour Urine Collection Kit for kidney stone analysis is required.  The kit may be picked up at the lab.     Collection:  Empty your bladder completely upon awakening in the morning and flush this urine. This void is not to be saved.     All urine passed during the rest of the day and night for the next 24 hours must be poured in the orange container. Urine can be collected in another clean container and carefully poured into the 24-hour collection container or you can urinate directly into the large orange container from the collection kit. This container must be stored in a refrigerator or in a cool location.     Exactly 24-hours after beginning the urine collection, empty your last voided specimen one last time into the container.     Immediately upon completion of your 24-hour collection, you should tighten lid of the orange container and shake vigorously for one minute. A good mix will assure accurate test results.     Note: High volume urine output  Patients who think they produce a large amount of urine may require more than one large orange collection container. Please ask the lab for two complete kits home.      Collect urine in the first container until it is ¾ full and then begin filling the second container.     Returning the container:   Drop off  the orange container and the laboratory orders at the [facility] lab facility. The container should be dropped off no later than 4 hours after completion of the urine collection if possible.     [facility]patient [facility] Hours (please note that hours are subject to change):  - Weekdays: 6 am - 8 pm  - Saturday: 6 am - 3 pm  - Closed on  Sunday  - You may go to the ER to drop off your specimen if the outpatient lab is closed.  - If you are using another lab for your collection please check their hours of operation.     Results:  Results take 10-14 days.  We will contact you when results are back.

## 2024-04-14 ENCOUNTER — HOSPITAL ENCOUNTER (INPATIENT)
Facility: HOSPITAL | Age: 24
LOS: 1 days | Discharge: HOME | End: 2024-04-15
Attending: EMERGENCY MEDICINE | Admitting: HOSPITALIST
Payer: COMMERCIAL

## 2024-04-14 ENCOUNTER — ANESTHESIA EVENT (OUTPATIENT)
Dept: SURGERY | Facility: HOSPITAL | Age: 24
End: 2024-04-14
Payer: COMMERCIAL

## 2024-04-14 ENCOUNTER — APPOINTMENT (OUTPATIENT)
Dept: GENERAL RADIOLOGY | Facility: HOSPITAL | Age: 24
End: 2024-04-14
Attending: SURGERY
Payer: COMMERCIAL

## 2024-04-14 ENCOUNTER — ANESTHESIA (OUTPATIENT)
Dept: SURGERY | Facility: HOSPITAL | Age: 24
End: 2024-04-14
Payer: COMMERCIAL

## 2024-04-14 ENCOUNTER — TELEPHONE (OUTPATIENT)
Dept: SURGERY | Facility: CLINIC | Age: 24
End: 2024-04-14

## 2024-04-14 DIAGNOSIS — N20.1 URETERAL STONE: Primary | ICD-10-CM

## 2024-04-14 LAB
ALBUMIN SERPL-MCNC: 4.6 G/DL (ref 3.2–4.8)
ALBUMIN/GLOB SERPL: 1.6 {RATIO} (ref 1–2)
ALP LIVER SERPL-CCNC: 53 U/L
ALT SERPL-CCNC: 26 U/L
ANION GAP SERPL CALC-SCNC: 3 MMOL/L (ref 0–18)
AST SERPL-CCNC: 30 U/L (ref ?–34)
BASOPHILS # BLD AUTO: 0.01 X10(3) UL (ref 0–0.2)
BASOPHILS NFR BLD AUTO: 0.1 %
BILIRUB SERPL-MCNC: 0.9 MG/DL (ref 0.3–1.2)
BILIRUB UR QL: NEGATIVE
BUN BLD-MCNC: 10 MG/DL (ref 9–23)
BUN/CREAT SERPL: 9.4 (ref 10–20)
CALCIUM BLD-MCNC: 10 MG/DL (ref 8.7–10.4)
CHLORIDE SERPL-SCNC: 107 MMOL/L (ref 98–112)
CLARITY UR: CLEAR
CO2 SERPL-SCNC: 29 MMOL/L (ref 21–32)
CREAT BLD-MCNC: 1.06 MG/DL
DEPRECATED RDW RBC AUTO: 36.8 FL (ref 35.1–46.3)
EGFRCR SERPLBLD CKD-EPI 2021: 100 ML/MIN/1.73M2 (ref 60–?)
EOSINOPHIL # BLD AUTO: 0.03 X10(3) UL (ref 0–0.7)
EOSINOPHIL NFR BLD AUTO: 0.4 %
ERYTHROCYTE [DISTWIDTH] IN BLOOD BY AUTOMATED COUNT: 12 % (ref 11–15)
GLOBULIN PLAS-MCNC: 2.9 G/DL (ref 2.8–4.4)
GLUCOSE BLD-MCNC: 94 MG/DL (ref 70–99)
GLUCOSE UR-MCNC: NORMAL MG/DL
HCT VFR BLD AUTO: 42.2 %
HGB BLD-MCNC: 15 G/DL
IMM GRANULOCYTES # BLD AUTO: 0.03 X10(3) UL (ref 0–1)
IMM GRANULOCYTES NFR BLD: 0.4 %
KETONES UR-MCNC: NEGATIVE MG/DL
LEUKOCYTE ESTERASE UR QL STRIP.AUTO: NEGATIVE
LYMPHOCYTES # BLD AUTO: 1.6 X10(3) UL (ref 1–4)
LYMPHOCYTES NFR BLD AUTO: 22.8 %
MCH RBC QN AUTO: 30.1 PG (ref 26–34)
MCHC RBC AUTO-ENTMCNC: 35.5 G/DL (ref 31–37)
MCV RBC AUTO: 84.7 FL
MONOCYTES # BLD AUTO: 0.67 X10(3) UL (ref 0.1–1)
MONOCYTES NFR BLD AUTO: 9.5 %
NEUTROPHILS # BLD AUTO: 4.68 X10 (3) UL (ref 1.5–7.7)
NEUTROPHILS # BLD AUTO: 4.68 X10(3) UL (ref 1.5–7.7)
NEUTROPHILS NFR BLD AUTO: 66.8 %
NITRITE UR QL STRIP.AUTO: NEGATIVE
OSMOLALITY SERPL CALC.SUM OF ELEC: 287 MOSM/KG (ref 275–295)
PH UR: 5.5 [PH] (ref 5–8)
PLATELET # BLD AUTO: 194 10(3)UL (ref 150–450)
POTASSIUM SERPL-SCNC: 4.3 MMOL/L (ref 3.5–5.1)
PROT SERPL-MCNC: 7.5 G/DL (ref 5.7–8.2)
PROT UR-MCNC: NEGATIVE MG/DL
RBC # BLD AUTO: 4.98 X10(6)UL
SODIUM SERPL-SCNC: 139 MMOL/L (ref 136–145)
SP GR UR STRIP: 1.02 (ref 1–1.03)
UROBILINOGEN UR STRIP-ACNC: NORMAL
WBC # BLD AUTO: 7 X10(3) UL (ref 4–11)

## 2024-04-14 PROCEDURE — BT1D1ZZ FLUOROSCOPY OF RIGHT KIDNEY, URETER AND BLADDER USING LOW OSMOLAR CONTRAST: ICD-10-PCS | Performed by: SURGERY

## 2024-04-14 PROCEDURE — 99222 1ST HOSP IP/OBS MODERATE 55: CPT | Performed by: SURGERY

## 2024-04-14 PROCEDURE — 74420 UROGRAPHY RTRGR +-KUB: CPT | Performed by: SURGERY

## 2024-04-14 PROCEDURE — 0T768DZ DILATION OF RIGHT URETER WITH INTRALUMINAL DEVICE, VIA NATURAL OR ARTIFICIAL OPENING ENDOSCOPIC: ICD-10-PCS | Performed by: SURGERY

## 2024-04-14 PROCEDURE — 52332 CYSTOSCOPY AND TREATMENT: CPT | Performed by: SURGERY

## 2024-04-14 PROCEDURE — 99223 1ST HOSP IP/OBS HIGH 75: CPT | Performed by: HOSPITALIST

## 2024-04-14 DEVICE — URETERAL STENT
Type: IMPLANTABLE DEVICE | Site: URETER | Status: FUNCTIONAL
Brand: ASCERTA™

## 2024-04-14 RX ORDER — KETOROLAC TROMETHAMINE 30 MG/ML
30 INJECTION, SOLUTION INTRAMUSCULAR; INTRAVENOUS ONCE
Status: COMPLETED | OUTPATIENT
Start: 2024-04-14 | End: 2024-04-14

## 2024-04-14 RX ORDER — MORPHINE SULFATE 4 MG/ML
4 INJECTION, SOLUTION INTRAMUSCULAR; INTRAVENOUS EVERY 2 HOUR PRN
Status: DISCONTINUED | OUTPATIENT
Start: 2024-04-14 | End: 2024-04-15

## 2024-04-14 RX ORDER — MORPHINE SULFATE 4 MG/ML
4 INJECTION, SOLUTION INTRAMUSCULAR; INTRAVENOUS EVERY 10 MIN PRN
Status: CANCELLED | OUTPATIENT
Start: 2024-04-14

## 2024-04-14 RX ORDER — MORPHINE SULFATE 2 MG/ML
1 INJECTION, SOLUTION INTRAMUSCULAR; INTRAVENOUS EVERY 2 HOUR PRN
Status: DISCONTINUED | OUTPATIENT
Start: 2024-04-14 | End: 2024-04-15

## 2024-04-14 RX ORDER — MORPHINE SULFATE 2 MG/ML
2 INJECTION, SOLUTION INTRAMUSCULAR; INTRAVENOUS EVERY 2 HOUR PRN
Status: DISCONTINUED | OUTPATIENT
Start: 2024-04-14 | End: 2024-04-15

## 2024-04-14 RX ORDER — MEPERIDINE HYDROCHLORIDE 25 MG/ML
12.5 INJECTION INTRAMUSCULAR; INTRAVENOUS; SUBCUTANEOUS AS NEEDED
Status: CANCELLED | OUTPATIENT
Start: 2024-04-14

## 2024-04-14 RX ORDER — MORPHINE SULFATE 10 MG/ML
6 INJECTION, SOLUTION INTRAMUSCULAR; INTRAVENOUS EVERY 10 MIN PRN
Status: CANCELLED | OUTPATIENT
Start: 2024-04-14

## 2024-04-14 RX ORDER — SENNOSIDES 8.6 MG
17.2 TABLET ORAL NIGHTLY PRN
Status: DISCONTINUED | OUTPATIENT
Start: 2024-04-14 | End: 2024-04-15

## 2024-04-14 RX ORDER — ONDANSETRON 2 MG/ML
INJECTION INTRAMUSCULAR; INTRAVENOUS AS NEEDED
Status: DISCONTINUED | OUTPATIENT
Start: 2024-04-14 | End: 2024-04-14 | Stop reason: SURG

## 2024-04-14 RX ORDER — PROCHLORPERAZINE EDISYLATE 5 MG/ML
5 INJECTION INTRAMUSCULAR; INTRAVENOUS EVERY 8 HOURS PRN
Status: CANCELLED | OUTPATIENT
Start: 2024-04-14

## 2024-04-14 RX ORDER — ACETAMINOPHEN 500 MG
500 TABLET ORAL EVERY 4 HOURS PRN
Status: DISCONTINUED | OUTPATIENT
Start: 2024-04-14 | End: 2024-04-15

## 2024-04-14 RX ORDER — SULFAMETHOXAZOLE AND TRIMETHOPRIM 800; 160 MG/1; MG/1
1 TABLET ORAL 2 TIMES DAILY
Qty: 6 TABLET | Refills: 0 | Status: SHIPPED | OUTPATIENT
Start: 2024-04-14 | End: 2024-04-18

## 2024-04-14 RX ORDER — GLYCOPYRROLATE 0.2 MG/ML
INJECTION, SOLUTION INTRAMUSCULAR; INTRAVENOUS AS NEEDED
Status: DISCONTINUED | OUTPATIENT
Start: 2024-04-14 | End: 2024-04-14 | Stop reason: SURG

## 2024-04-14 RX ORDER — DEXAMETHASONE SODIUM PHOSPHATE 4 MG/ML
VIAL (ML) INJECTION AS NEEDED
Status: DISCONTINUED | OUTPATIENT
Start: 2024-04-14 | End: 2024-04-14 | Stop reason: SURG

## 2024-04-14 RX ORDER — ENEMA 19; 7 G/133ML; G/133ML
1 ENEMA RECTAL ONCE AS NEEDED
Status: DISCONTINUED | OUTPATIENT
Start: 2024-04-14 | End: 2024-04-15

## 2024-04-14 RX ORDER — HYDROMORPHONE HYDROCHLORIDE 1 MG/ML
0.6 INJECTION, SOLUTION INTRAMUSCULAR; INTRAVENOUS; SUBCUTANEOUS EVERY 5 MIN PRN
Status: CANCELLED | OUTPATIENT
Start: 2024-04-14

## 2024-04-14 RX ORDER — LIDOCAINE HYDROCHLORIDE 10 MG/ML
INJECTION, SOLUTION EPIDURAL; INFILTRATION; INTRACAUDAL; PERINEURAL AS NEEDED
Status: DISCONTINUED | OUTPATIENT
Start: 2024-04-14 | End: 2024-04-14 | Stop reason: SURG

## 2024-04-14 RX ORDER — SODIUM CHLORIDE 9 MG/ML
INJECTION, SOLUTION INTRAVENOUS CONTINUOUS
Status: DISCONTINUED | OUTPATIENT
Start: 2024-04-14 | End: 2024-04-15

## 2024-04-14 RX ORDER — NALOXONE HYDROCHLORIDE 0.4 MG/ML
80 INJECTION, SOLUTION INTRAMUSCULAR; INTRAVENOUS; SUBCUTANEOUS AS NEEDED
Status: CANCELLED | OUTPATIENT
Start: 2024-04-14 | End: 2024-04-15

## 2024-04-14 RX ORDER — BISACODYL 10 MG
10 SUPPOSITORY, RECTAL RECTAL
Status: DISCONTINUED | OUTPATIENT
Start: 2024-04-14 | End: 2024-04-15

## 2024-04-14 RX ORDER — IOPAMIDOL 612 MG/ML
INJECTION, SOLUTION INTRATHECAL AS NEEDED
Status: DISCONTINUED | OUTPATIENT
Start: 2024-04-14 | End: 2024-04-14 | Stop reason: HOSPADM

## 2024-04-14 RX ORDER — ONDANSETRON 2 MG/ML
4 INJECTION INTRAMUSCULAR; INTRAVENOUS EVERY 6 HOURS PRN
Status: DISCONTINUED | OUTPATIENT
Start: 2024-04-14 | End: 2024-04-15

## 2024-04-14 RX ORDER — HYDROCODONE BITARTRATE AND ACETAMINOPHEN 5; 325 MG/1; MG/1
1 TABLET ORAL EVERY 4 HOURS PRN
Status: DISCONTINUED | OUTPATIENT
Start: 2024-04-14 | End: 2024-04-15

## 2024-04-14 RX ORDER — PROCHLORPERAZINE EDISYLATE 5 MG/ML
5 INJECTION INTRAMUSCULAR; INTRAVENOUS EVERY 8 HOURS PRN
Status: DISCONTINUED | OUTPATIENT
Start: 2024-04-14 | End: 2024-04-15

## 2024-04-14 RX ORDER — HYDROCODONE BITARTRATE AND ACETAMINOPHEN 5; 325 MG/1; MG/1
2 TABLET ORAL EVERY 4 HOURS PRN
Status: DISCONTINUED | OUTPATIENT
Start: 2024-04-14 | End: 2024-04-15

## 2024-04-14 RX ORDER — HYDROMORPHONE HYDROCHLORIDE 1 MG/ML
0.4 INJECTION, SOLUTION INTRAMUSCULAR; INTRAVENOUS; SUBCUTANEOUS EVERY 5 MIN PRN
Status: CANCELLED | OUTPATIENT
Start: 2024-04-14

## 2024-04-14 RX ORDER — SODIUM CHLORIDE, SODIUM LACTATE, POTASSIUM CHLORIDE, CALCIUM CHLORIDE 600; 310; 30; 20 MG/100ML; MG/100ML; MG/100ML; MG/100ML
INJECTION, SOLUTION INTRAVENOUS CONTINUOUS
Status: CANCELLED | OUTPATIENT
Start: 2024-04-14

## 2024-04-14 RX ORDER — ACETAMINOPHEN 325 MG/1
650 TABLET ORAL EVERY 4 HOURS PRN
Status: DISCONTINUED | OUTPATIENT
Start: 2024-04-14 | End: 2024-04-15

## 2024-04-14 RX ORDER — POLYETHYLENE GLYCOL 3350 17 G/17G
17 POWDER, FOR SOLUTION ORAL DAILY PRN
Status: DISCONTINUED | OUTPATIENT
Start: 2024-04-14 | End: 2024-04-15

## 2024-04-14 RX ORDER — MORPHINE SULFATE 2 MG/ML
2 INJECTION, SOLUTION INTRAMUSCULAR; INTRAVENOUS EVERY 10 MIN PRN
Status: CANCELLED | OUTPATIENT
Start: 2024-04-14

## 2024-04-14 RX ORDER — LIDOCAINE HYDROCHLORIDE 20 MG/ML
JELLY TOPICAL AS NEEDED
Status: DISCONTINUED | OUTPATIENT
Start: 2024-04-14 | End: 2024-04-14 | Stop reason: HOSPADM

## 2024-04-14 RX ORDER — PHENAZOPYRIDINE HYDROCHLORIDE 100 MG/1
100 TABLET, FILM COATED ORAL 3 TIMES DAILY PRN
Qty: 10 TABLET | Refills: 0 | Status: ON HOLD | OUTPATIENT
Start: 2024-04-14 | End: 2024-04-18

## 2024-04-14 RX ORDER — ONDANSETRON 2 MG/ML
4 INJECTION INTRAMUSCULAR; INTRAVENOUS EVERY 6 HOURS PRN
Status: CANCELLED | OUTPATIENT
Start: 2024-04-14

## 2024-04-14 RX ORDER — HYDROMORPHONE HYDROCHLORIDE 1 MG/ML
0.2 INJECTION, SOLUTION INTRAMUSCULAR; INTRAVENOUS; SUBCUTANEOUS EVERY 5 MIN PRN
Status: CANCELLED | OUTPATIENT
Start: 2024-04-14

## 2024-04-14 RX ORDER — OXYBUTYNIN CHLORIDE 5 MG/1
5 TABLET ORAL 3 TIMES DAILY PRN
Qty: 15 TABLET | Refills: 0 | Status: SHIPPED | OUTPATIENT
Start: 2024-04-14

## 2024-04-14 RX ORDER — KETOROLAC TROMETHAMINE 10 MG/1
10 TABLET, FILM COATED ORAL EVERY 8 HOURS PRN
Qty: 15 TABLET | Refills: 0 | Status: SHIPPED | OUTPATIENT
Start: 2024-04-14 | End: 2024-04-14

## 2024-04-14 RX ADMIN — SODIUM CHLORIDE: 9 INJECTION, SOLUTION INTRAVENOUS at 19:18:00

## 2024-04-14 RX ADMIN — GLYCOPYRROLATE 0.2 MG: 0.2 INJECTION, SOLUTION INTRAMUSCULAR; INTRAVENOUS at 19:02:00

## 2024-04-14 RX ADMIN — ONDANSETRON 4 MG: 2 INJECTION INTRAMUSCULAR; INTRAVENOUS at 18:47:00

## 2024-04-14 RX ADMIN — DEXAMETHASONE SODIUM PHOSPHATE 4 MG: 4 MG/ML VIAL (ML) INJECTION at 18:47:00

## 2024-04-14 RX ADMIN — LIDOCAINE HYDROCHLORIDE 25 MG: 10 INJECTION, SOLUTION EPIDURAL; INFILTRATION; INTRACAUDAL; PERINEURAL at 18:47:00

## 2024-04-14 NOTE — ANESTHESIA PREPROCEDURE EVALUATION
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    Gynecomastia 06/08/2020    Family history of breast cancer in mother at 49 06/08/2020    Breast mass in male 06/08/2020    Family history of BRCA gene mutation 06/08/2020       Past Medical History:    ADD (attention deficit disorder)    NO MEDICATION    Asthma (HCC)    SPORTS INDUCED    Attention deficit hyperactivity disorder (ADHD)    NO MEDICATION    Calculus of kidney    Hx of motion sickness    Kawasaki disease (HCC)    unspecified    Kidney stones       Past Surgical History:   Procedure Laterality Date    Lasik         Medications Prior to Admission   Medication Sig Dispense Refill Last Dose    HYDROcodone-acetaminophen 5-325 MG Oral Tab Take 1-2 tablets by mouth every 4 (four) hours as needed for Pain. 20 tablet 0     ondansetron 4 MG Oral Tablet Dispersible Take 1 tablet (4 mg total) by mouth every 4 (four) hours as needed for Nausea. 10 tablet 0     tamsulosin (FLOMAX) 0.4 MG Oral Cap Take 1 capsule (0.4 mg total) by mouth daily for 7 days. 7 capsule 0     polyethylene glycol, PEG 3350, 17 g Oral Powd Pack Take 17 g by mouth daily as needed. 12 each 0     Ketorolac Tromethamine 10 MG Oral Tab Take 1 tablet (10 mg total) by mouth every 6 (six) hours as needed for Pain. 12 tablet 0      Current Facility-Administered Medications Ordered in Epic   Medication Dose Route Frequency Provider Last Rate Last Admin    sodium chloride 0.9% infusion   Intravenous Continuous Dalila Schroeder MD         acetaminophen (Tylenol Extra Strength) tab 500 mg  500 mg Oral Q4H PRN Dalila Schroeder MD        acetaminophen (Tylenol) tab 650 mg  650 mg Oral Q4H PRN Dalila Schroeder MD        Or    HYDROcodone-acetaminophen (Norco) 5-325 MG per tab 1 tablet  1 tablet Oral Q4H PRN Dalila Schroeder MD        Or    HYDROcodone-acetaminophen (Norco) 5-325 MG per tab 2 tablet  2 tablet Oral Q4H PRN Dalila Schroeder MD        morphINE PF 2 MG/ML injection 1 mg  1 mg Intravenous Q2H PRN Dalila Schroeder MD        Or    morphINE PF 2 MG/ML injection 2 mg  2 mg Intravenous Q2H PRN Dalila Schroeder MD        Or    morphINE PF 4 MG/ML injection 4 mg  4 mg Intravenous Q2H PRN Dalila Schroeder MD        polyethylene glycol (PEG 3350) (Miralax) 17 g oral packet 17 g  17 g Oral Daily PRN Dalila Schroeder MD        sennosides (Senokot) tab 17.2 mg  17.2 mg Oral Nightly PRN Dalila Schroeder MD        bisacodyl (Dulcolax) 10 MG rectal suppository 10 mg  10 mg Rectal Daily PRN Dalila Schroeder MD        fleet enema (Fleet) 7-19 GM/118ML rectal enema 133 mL  1 enema Rectal Once PRN Dalila Schroeder MD        ondansetron (Zofran) 4 MG/2ML injection 4 mg  4 mg Intravenous Q6H PRN Dalila Schroeder MD        prochlorperazine (Compazine) 10 MG/2ML injection 5 mg  5 mg Intravenous Q8H PRN Dalila Schroeder MD        [START ON 4/15/2024] cefTRIAXone (Rocephin) 2 g in D5W 100 mL IVPB-ADD  2 g Intravenous Q24H Dalila Schroeder MD         No current Epic-ordered outpatient medications on file.       Allergies   Allergen Reactions    Erythromycin SWELLING     EXACERBATED KAWASAKI-SWELLING, RASH, FEVER       Family History   Problem Relation Age of Onset    Cancer Maternal Grandfather         prostate    Cancer Other     Breast Cancer Mother 49    Breast Cancer Maternal Grandmother 72    Heart Disease Neg     Stroke Neg      Social History     Socioeconomic History     Marital status: Single   Tobacco Use    Smoking status: Never     Passive exposure: Never    Smokeless tobacco: Never   Vaping Use    Vaping status: Never Used   Substance and Sexual Activity    Alcohol use: Yes     Comment: SOCIALLY    Drug use: Never       Available pre-op labs reviewed.  Lab Results   Component Value Date    WBC 7.0 04/14/2024    RBC 4.98 04/14/2024    HGB 15.0 04/14/2024    HCT 42.2 04/14/2024    MCV 84.7 04/14/2024    MCH 30.1 04/14/2024    MCHC 35.5 04/14/2024    RDW 12.0 04/14/2024    .0 04/14/2024     Lab Results   Component Value Date     04/14/2024    K 4.3 04/14/2024     04/14/2024    CO2 29.0 04/14/2024    BUN 10 04/14/2024    CREATSERUM 1.06 04/14/2024    GLU 94 04/14/2024    CA 10.0 04/14/2024          Vital Signs:  Body mass index is 28.75 kg/m².   weight is 115.8 kg (255 lb 3.2 oz). His oral temperature is 97.5 °F (36.4 °C). His blood pressure is 120/69 and his pulse is 57. His respiration is 16 and oxygen saturation is 96%.   Vitals:    04/14/24 1236 04/14/24 1238 04/14/24 1629 04/14/24 1641   BP: 108/70  117/79 120/69   Pulse: 73  50 57   Resp: 22  20 16   Temp: 97.7 °F (36.5 °C)   97.5 °F (36.4 °C)   TempSrc: Temporal   Oral   SpO2: 98%  99% 96%   Weight:  115.8 kg (255 lb 3.2 oz)          Anesthesia Evaluation     Patient summary reviewed and Nursing notes reviewed    Airway   Mallampati: II  TM distance: >3 FB  Neck ROM: full  Dental      Pulmonary - normal exam    breath sounds clear to auscultation  (+) asthma  Cardiovascular - negative ROS and normal exam    Rhythm: regular  Rate: normal    Neuro/Psych    (+)   attention deficit hyperactivity disorder, attention deficit disorder      GI/Hepatic/Renal - negative ROS     Endo/Other - negative ROS   Abdominal                  Anesthesia Plan:   ASA:  2  Plan:   General  Airway:  LMA  Post-op Pain Management: IV analgesics  Informed Consent Plan and Risks Discussed With:  Patient  Discussed plan with:   Surgeon      I have informed Bartolo Veliz and/or legal guardian or family member of the nature of the anesthetic plan, benefits, risks including possible dental damage if relevant, major complications, and any alternative forms of anesthetic management.   All of the patient's questions were answered to the best of my ability. The patient desires the anesthetic management as planned.  OMAR CAREY MD  4/14/2024 5:57 PM  Present on Admission:  **None**

## 2024-04-14 NOTE — ED PROVIDER NOTES
Patient Seen in: [facility] Emergency Department      History     Chief Complaint   Patient presents with    Abdomen/Flank Pain     Stated Complaint: Kidney Stone    Subjective:   HPI    23-year-old male with history of kidney stone presents with plans for ureteral stent placement today.  The patient was found to have an obstructing 6 mm stone in the right distal ureter on a CT at [facility] on 4/3.  Stone was noted in the same location on 2/23 as well.  The patient has had intermittent pain which has been increasing.  He had plans for cystoscopy, lithotripsy, and stone removal on 4/18 but his pain seemed to worsen over the past 2 days.  He spoke with the on-call urologist today who gave him the option of coming into the hospital for stent placement today to try to relieve his pain.  He denies any recent fevers.  He reports multiple episodes of emesis yesterday but denies emesis today.  No nausea and mild pain at present.  He states he has not eaten or drank today.    Objective:   Past Medical History:    ADD (attention deficit disorder)    NO MEDICATION    Asthma (HCC)    SPORTS INDUCED    Attention deficit hyperactivity disorder (ADHD)    NO MEDICATION    Calculus of kidney    Hx of motion sickness    Kawasaki disease (HCC)    unspecified    Kidney stones              Past Surgical History:   Procedure Laterality Date    Lasik                  Social History     Socioeconomic History    Marital status: Single   Tobacco Use    Smoking status: Never     Passive exposure: Never    Smokeless tobacco: Never   Vaping Use    Vaping status: Never Used   Substance and Sexual Activity    Alcohol use: Yes     Comment: SOCIALLY    Drug use: Never     Social Determinants of Health     Food Insecurity: No Food Insecurity (4/14/2024)    Food Insecurity     Food Insecurity: Never true   Transportation Needs: No Transportation Needs (4/14/2024)    Transportation Needs     Lack of Transportation: No   Housing  Stability: Low Risk  (4/14/2024)    Housing Stability     Housing Instability: No              Review of Systems    Positive for stated complaint: Kidney Stone  Other systems are as noted in HPI.  Constitutional and vital signs reviewed.      All other systems reviewed and negative except as noted above.    Physical Exam     ED Triage Vitals [04/14/24 1236]   /70   Pulse 73   Resp 22   Temp 97.7 °F (36.5 °C)   Temp src Temporal   SpO2 98 %   O2 Device None (Room air)       Current:/89   Pulse (!) 49   Temp 97.5 °F (36.4 °C) (Temporal)   Resp 16   Wt 115.8 kg   SpO2 96%   BMI 28.76 kg/m²         Physical Exam      General Appearance: alert, no distress  Eyes: pupils equal and round no pallor or injection  ENT, Mouth: mucous membranes moist  Respiratory: there are no retractions, lungs are clear to auscultation  Cardiovascular: regular rate and rhythm  Gastrointestinal:  abdomen is soft and non tender, no masses, bowel sounds normal.  Right CVA tenderness.  Neurological: Beach normal.  Moving extremities x 4.  Skin: warm and dry, no rashes.  Musculoskeletal: neck is supple non tender        Extremities are symmetrical, full range of motion  Psychiatric: patient is oriented X 3, there is no agitation    ED Course     Labs Reviewed   COMP METABOLIC PANEL (14) - Abnormal; Notable for the following components:       Result Value    BUN/CREA Ratio 9.4 (*)     All other components within normal limits   URINALYSIS WITH CULTURE REFLEX - Abnormal; Notable for the following components:    Blood Urine 1+ (*)     WBC Urine 6-10 (*)     RBC Urine 6-10 (*)     Squamous Epi. Cells Few (*)     All other components within normal limits   CBC WITH DIFFERENTIAL WITH PLATELET    Narrative:     The following orders were created for panel order CBC With Differential With Platelet.  Procedure                               Abnormality         Status                     ---------                               -----------          ------                     CBC W/ DIFFERENTIAL[[id number]]                              Final result                 Please view results for these tests on the individual orders.   RAINBOW DRAW LAVENDER   RAINBOW DRAW LIGHT GREEN   CBC W/ DIFFERENTIAL                    MDM      Communicated with Dr. Dacosta, urology.  Plan to admit to the hospitalist with plans for stent placement later today.  Will begin IV fluids and IV antibiotics and will keep the patient NPO.  Also discussed with Dr. Leblanc, hospitalist.  Admission disposition: 4/14/2024  3:16 PM                                        Medical Decision Making      Disposition and Plan     Clinical Impression:  1. Ureteral stone         Disposition:  Admit  4/14/2024  3:16 pm    Follow-up:  No follow-up provider specified.        Medications Prescribed:  Current Discharge Medication List        START taking these medications    Details   sulfamethoxazole-trimethoprim DS (BACTRIM DS) 800-160 MG Oral Tab per tablet Take 1 tablet by mouth 2 (two) times daily for 3 days.  Qty: 6 tablet, Refills: 0      phenazopyridine (PYRIDIUM) 100 MG Oral Tab Take 1 tablet (100 mg total) by mouth 3 (three) times daily as needed for Pain. This will turn your urine orange.  Qty: 10 tablet, Refills: 0      oxybutynin 5 MG Oral Tab Take 1 tablet (5 mg total) by mouth 3 (three) times daily as needed (Bladder spasms). Stop 12 hours before Jarquin catheter removal in clinic (if applicable)  Qty: 15 tablet, Refills: 0                               Hospital Problems       Present on Admission  Date Reviewed: 4/14/2024            ICD-10-CM Noted POA    * (Principal) Ureteral stone N20.1 4/14/2024 Unknown

## 2024-04-14 NOTE — INTERVAL H&P NOTE
Pre-op Diagnosis: Nephrolithiasis [N20.0]    The above referenced H&P was reviewed by Tavares Dacosta MD on 4/14/2024, the patient was examined and no significant changes have occurred in the patient's condition since the H&P was performed.  I discussed with the patient and/or legal representative the potential benefits, risks and side effects of this procedure; the likelihood of the patient achieving goals; and potential problems that might occur during recuperation.  I discussed reasonable alternatives to the procedure, including risks, benefits and side effects related to the alternatives and risks related to not receiving this procedure.  We will proceed with procedure as planned.

## 2024-04-14 NOTE — PROGRESS NOTES
[facility] Note: Antimicrobial Weight Based Dose Adjustment for: ceftriaxone (ROCEPHIN)    Bartolo Veliz is a 23 year old patient who has been prescribed ceftriaxone (ROCEPHIN) 1 g every 24 hours.    Estimated Creatinine Clearance: 141.2 mL/min (based on SCr of 1.06 mg/dL).  Wt Readings from Last 6 Encounters:   04/14/24 115.8 kg (255 lb 3.2 oz)   04/09/24 115.7 kg (255 lb)   04/03/24 115.7 kg (255 lb)   02/23/24 115.7 kg (255 lb)   06/08/23 114.3 kg (252 lb)   05/25/23 114.8 kg (253 lb)     Body mass index is 28.75 kg/m².    Based on this criteria and renal function, dose will be adjusted to ceftriaxone (ROCEPHIN) 2 g every 24 hours.    Thank you,    Humaira Donahue, PharmD  4/14/2024  4:40 PM

## 2024-04-14 NOTE — PLAN OF CARE
Patient is alert and oriented. From ED to floor. Admitted and oriented to unit. Pain under control per patient. IV fluids infusing. NPO. Plan for ureteral stent placement this evening with Dr Dacosta. Safety precautions in place.     Problem: Patient Centered Care  Goal: Patient preferences are identified and integrated in the patient's plan of care  Description: Interventions:  - What would you like us to know as we care for you?   - Provide timely, complete, and accurate information to patient/family  - Incorporate patient and family knowledge, values, beliefs, and cultural backgrounds into the planning and delivery of care  - Encourage patient/family to participate in care and decision-making at the level they choose  - Honor patient and family perspectives and choices  Outcome: Progressing     Problem: Patient/Family Goals  Goal: Patient/Family Long Term Goal  Description: Patient's Long Term Goal:     Interventions:  -   - See additional Care Plan goals for specific interventions  Outcome: Progressing  Goal: Patient/Family Short Term Goal  Description: Patient's Short Term Goal:     Interventions:   -   - See additional Care Plan goals for specific interventions  Outcome: Progressing     Problem: GASTROINTESTINAL - ADULT  Goal: Minimal or absence of nausea and vomiting  Description: INTERVENTIONS:  - Maintain adequate hydration with IV or PO as ordered and tolerated  - Nasogastric tube to low intermittent suction as ordered  - Evaluate effectiveness of ordered antiemetic medications  - Provide nonpharmacologic comfort measures as appropriate  - Advance diet as tolerated, if ordered  - Obtain nutritional consult as needed  - Evaluate fluid balance  Outcome: Progressing     Problem: PAIN - ADULT  Goal: Verbalizes/displays adequate comfort level or patient's stated pain goal  Description: INTERVENTIONS:  - Encourage pt to monitor pain and request assistance  - Assess pain using appropriate pain scale  - Administer  analgesics based on type and severity of pain and evaluate response  - Implement non-pharmacological measures as appropriate and evaluate response  - Consider cultural and social influences on pain and pain management  - Manage/alleviate anxiety  - Utilize distraction and/or relaxation techniques  - Monitor for opioid side effects  - Notify MD/LIP if interventions unsuccessful or patient reports new pain  - Anticipate increased pain with activity and pre-medicate as appropriate  Outcome: Progressing     Problem: DISCHARGE PLANNING  Goal: Discharge to home or other facility with appropriate resources  Description: INTERVENTIONS:  - Identify barriers to discharge w/pt and caregiver  - Include patient/family/discharge partner in discharge planning  - Arrange for needed discharge resources and transportation as appropriate  - Identify discharge learning needs (meds, wound care, etc)  - Arrange for interpreters to assist at discharge as needed  - Consider post-discharge preferences of patient/family/discharge partner  - Complete POLST form as appropriate  - Assess patient's ability to be responsible for managing their own health  - Refer to Case Management Department for coordinating discharge planning if the patient needs post-hospital services based on physician/LIP order or complex needs related to functional status, cognitive ability or social support system  Outcome: Progressing

## 2024-04-14 NOTE — H&P
[facility]  part of [facility]    History & Physical    Bartolo Veliz Patient Status:  Emergency    2000 MRN [id number]   Location [facility] EMERGENCY DEPARTMENT Attending Jose Shell MD   Hosp Day # 0 PCP Keira Field     Date:  2024  Date of Admission:  2024    History provided by:patient  Chief Complaint:     Chief Complaint   Patient presents with    Abdomen/Flank Pain       HPI:   Bartolo Veliz is a(n) 23 year old male with a PMH of ADD, sports induced asthma and recent diagnosis of right distal ureteral stent who was scheduled as outpt for ureteroscopy and laser lithotripsy with Dr Carlson on 24 who presents with persistent severe right flank pain.  He came to the ED for further evaluation per urology recommendation.  In the ER his pain was controlled and he will be admitted for ureteral stent placement today with Dr Dacosta.     History     Past Medical History:    ADD (attention deficit disorder)    NO MEDICATION    Asthma (HCC)    SPORTS INDUCED    Attention deficit hyperactivity disorder (ADHD)    NO MEDICATION    Calculus of kidney    Hx of motion sickness    Kawasaki disease (HCC)    unspecified    Kidney stones     Past Surgical History:   Procedure Laterality Date    [place]       Family History   Problem Relation Age of Onset    Cancer Maternal Grandfather         prostate    Cancer Other     Breast Cancer Mother 49    Breast Cancer Maternal Grandmother 72    Heart Disease Neg     Stroke Neg      Social History:  Social History     Socioeconomic History    Marital status: Single   Tobacco Use    Smoking status: Never     Passive exposure: Never    Smokeless tobacco: Never   Vaping Use    Vaping status: Never Used   Substance and Sexual Activity    Alcohol use: Yes     Comment: SOCIALLY    Drug use: Never     Allergies/Medications:   Allergies:   Allergies   Allergen Reactions    Erythromycin SWELLING     EXACERBATED KAWASAKI-SWELLING, RASH, FEVER      (Not in a hospital admission)      Review of Systems:   Pertinent items are noted in HPI.  12 ROS completed and otherwise negative    Physical Exam:   Vital Signs:  Blood pressure 108/70, pulse 73, temperature 97.7 °F (36.5 °C), temperature source Temporal, resp. rate 22, weight 255 lb 3.2 oz (115.8 kg), SpO2 98%.     Gen: No acute distress  Pulm: Lungs clear, normal respiratory effort  CV: Heart with regular rate and rhythm  Abd: Abdomen soft, nontender, nondistended, bowel sounds present  Neuro: No acute focal deficits  MSK: Full range of motion in extremities  Skin: Warm and dry  Psych: Normal affect  Ext: no c/c/e          Results:     Lab Results   Component Value Date    WBC 9.0 04/03/2024    HGB 13.8 04/03/2024    HCT 40.4 04/03/2024    .0 04/03/2024    CREATSERUM 0.98 04/03/2024    BUN 19 04/03/2024     04/03/2024    K 3.6 04/03/2024     04/03/2024    CO2 28.0 04/03/2024     (H) 04/03/2024    CA 9.4 04/03/2024    ALB 3.7 04/03/2024    ALKPHO 53 04/03/2024    BILT 0.7 04/03/2024    TP 7.2 04/03/2024    AST 16 04/03/2024    ALT 31 04/03/2024    TSH 0.761 06/18/2020    LIP 47 (L) 07/11/2020    ESRML 6 05/05/2018    CRP <0.29 05/05/2018       No results found.        Assessment/Plan:       Right Ureteral stone  - urology consulted  - plan ureteral stent placement today  - keep npo  - IV morphine prn pain  - IV zofran prn nausea  - cont IV fluids  - given IV ceftriaxone    DVT proph: SCDs    Dispo: pending clinical course    MDM: high Dalila Schroeder MD  4/14/2024        Greater than 75 minutes spent in preparation of this admission in examining patient, obtaining history, reviewing records and d/w patient/family/consultants.

## 2024-04-14 NOTE — CONSULTS
Urology Inpatient Consult Note  [facility]  part of [facility]      Bartolo Veliz Patient Status:  Emergency    2000 MRN [id number]   Location [facility] EMERGENCY DEPARTMENT Attending No att. providers found   Hosp Day # 0 PCP Keira Field     Date of Admission:  2024  Date of Consult: 2024  Primary Care Provider: Keira Field     Consulting Provider: Dr. Shell    Reason for Consultation:   Nephrolithiasis    History of Present Illness:   Bartolo Veliz is a 23 year old male with history of ADD seen by DOROTHY Barba recently for right distal ureteral stone.  He presented to the ED on 2024 for right flank pain and was found to have a 6 mm distal right ureteral stone.  He was discharged with medical expulsive therapy.  He returned to the ED on 4/3/2024 with recurrence of right flank pain.  CT confirmed persistence of the right distal ureteral stone.  He had no infectious signs or symptoms.  He is tentatively scheduled for definitive stone treatment with ureteroscopy and laser lithotripsy with Dr. Carlson on 2024.    Patient called me several times today with persistent severe pain refractory to Toradol, narcotics, tamsulosin.  He denies fevers or chills.  Given intractable pain I advised him to present to the ED for admission and ureteral stent placement.    In the ED he is afebrile with normal vital signs.  Labs show creatinine 1.06, WBC 7.0.  Urinalysis shows 6-10 WBC, 6-10 RBC, negative nitrates, negative leukocyte esterase.    History:     Past Medical History:    ADD (attention deficit disorder)    NO MEDICATION    Asthma (HCC)    SPORTS INDUCED    Attention deficit hyperactivity disorder (ADHD)    NO MEDICATION    Calculus of kidney    Hx of motion sickness    Kawasaki disease (HCC)    unspecified    Kidney stones       Past Surgical History:   Procedure Laterality Date    Lasik         Family History   Problem Relation Age of Onset    Cancer  Maternal Grandfather         prostate    Cancer Other     Breast Cancer Mother 49    Breast Cancer Maternal Grandmother 72    Heart Disease Neg     Stroke Neg        Social History     Socioeconomic History    Marital status: Single   Tobacco Use    Smoking status: Never     Passive exposure: Never    Smokeless tobacco: Never   Vaping Use    Vaping status: Never Used   Substance and Sexual Activity    Alcohol use: Yes     Comment: SOCIALLY    Drug use: Never       Medications:  No current outpatient medications on file.       Allergies:  Allergies   Allergen Reactions    Erythromycin SWELLING     EXACERBATED KAWASAKI-SWELLING, RASH, FEVER       Review of Systems:   A comprehensive 10-point review of systems was completed.  Pertinent positives and negatives are noted in the the HPI.    Physical Exam:   Vital Signs:  Blood pressure 108/70, pulse 73, temperature 97.7 °F (36.5 °C), temperature source Temporal, resp. rate 22, weight 255 lb 3.2 oz (115.8 kg), SpO2 98%.     CONSTITUTIONAL: Well developed, well nourished, in no acute distress  NEUROLOGIC: Alert and oriented  HEAD: Normocephalic, atraumatic  EYES: Sclera non-icteric  ENT: Hearing intact, moist mucous membranes  NECK: No obvious goiter or masses  RESPIRATORY: Normal respiratory effort  SKIN: No evident rashes  ABDOMEN: Soft, non-tender, non-distended, right flank tenderness    Laboratory Data:  Lab Results   Component Value Date    WBC 7.0 04/14/2024    HGB 15.0 04/14/2024    .0 04/14/2024     Lab Results   Component Value Date     04/14/2024    K 4.3 04/14/2024     04/14/2024    CO2 29.0 04/14/2024    BUN 10 04/14/2024    GLU 94 04/14/2024    GFRAA 107 07/11/2020    AST 30 04/14/2024    ALT 26 04/14/2024    TP 7.5 04/14/2024    ALB 4.6 04/14/2024    CA 10.0 04/14/2024       Urinalysis Results (last three years):  Recent Labs     03/02/23  1308 02/23/24  0141 04/03/24  0213 04/09/24  1221 04/14/24  1555   COLORUR  --  Yellow Yellow  --   Light-Yellow   CLARITY  --  Clear Clear  --  Clear   SPECGRAVITY 1.025 >=1.030 >=1.030 1.020 1.018   PHURINE  --  6.0 5.5 7.0 5.5   PROUR  --  100 mg/dL* Negative  --  Negative   GLUUR Negative Negative Negative  --  Normal   KETUR  --  15* Trace*  --  Negative   BILUR  --  Small* Negative  --  Negative   BLOODURINE  --  Large* Moderate*  --  1+*   NITRITE  --  Negative Negative Negative Negative   UROBILINOGEN  --  0.2 0.2  --  Normal   LEUUR  --  Negative Negative  --  Negative   WBCUR  --  1-5 1-5  1-5  --  6-10*   RBCUR  --  >10* 6-10*  6-10*  --  6-10*   BACUR  --  None Seen None Seen  None Seen  --  None Seen       Urine Culture Results (last three years):  Lab Results   Component Value Date    URINECUL No Growth 2 Days 04/09/2024    URINECUL No Growth at 18-24 hrs. 07/11/2020       PSA:  No results found for: \"PSA\", \"PERCENTPSA\", \"PSAS\", \"PSAULTRA\", \"QPSA\", \"PSATOT\", \"TOTPSADX\", \"TOTPSASCREEN\"     Imaging (last three days):  No results found.       Impression:   Bartolo Veliz is a 23 year old male with history of ADD seen by DOORTHY Barba recently for right distal ureteral stone.  He presented to the ED on 2/23/2024 for right flank pain and was found to have a 6 mm distal right ureteral stone.  He was discharged with medical expulsive therapy.  He returned to the ED on 4/3/2024 with recurrence of right flank pain.  CT confirmed persistence of the right distal ureteral stone.  He had no infectious signs or symptoms.  He is tentatively scheduled for definitive stone treatment with ureteroscopy and laser lithotripsy with Dr. Carlson on 4/18/2024.    Patient called me several times today with persistent severe pain refractory to Toradol, narcotics, tamsulosin.  He denies fevers or chills.  Given intractable pain I advised him to present to the ED for admission and ureteral stent placement.    In the ED he is afebrile with normal vital signs.  Labs show creatinine 1.06, WBC 7.0.  Urinalysis shows 6-10  WBC, 6-10 RBC, negative nitrates, negative leukocyte esterase.    Recommendations:   -OR today for cystoscopy, right retrograde pyelgram and ureteral stent placement  -Follow-up urine culture  -If doing well can have ureteroscopy as scheduled on 4/18/2024      I have personally reviewed all relevant medical records, labs, and imaging.    Thank you for this consult. Please call if there are any questions or concerns.    Medical Decision Making  Nephrolithiasis: Undiagnosed new problem    Amount and/or Complexity of Data Reviewed  External Data Reviewed: labs and notes.  Radiology: independent interpretation performed.    Risk  Minor surgery with no identified risk factors.        Tvaares Dacosta MD  Staff Urologist  [facility]  Office: [phone]

## 2024-04-14 NOTE — H&P (VIEW-ONLY)
Urology Inpatient Consult Note  [facility]  part of [facility]      Bartolo Veliz Patient Status:  Emergency    2000 MRN [id number]   Location [facility] EMERGENCY DEPARTMENT Attending No att. providers found   Hosp Day # 0 PCP Keira Field     Date of Admission:  2024  Date of Consult: 2024  Primary Care Provider: Keira Field     Consulting Provider: Dr. Shell    Reason for Consultation:   Nephrolithiasis    History of Present Illness:   Bartolo Veliz is a 23 year old male with history of ADD seen by DOROTHY Barba recently for right distal ureteral stone.  He presented to the ED on 2024 for right flank pain and was found to have a 6 mm distal right ureteral stone.  He was discharged with medical expulsive therapy.  He returned to the ED on 4/3/2024 with recurrence of right flank pain.  CT confirmed persistence of the right distal ureteral stone.  He had no infectious signs or symptoms.  He is tentatively scheduled for definitive stone treatment with ureteroscopy and laser lithotripsy with Dr. Carlson on 2024.    Patient called me several times today with persistent severe pain refractory to Toradol, narcotics, tamsulosin.  He denies fevers or chills.  Given intractable pain I advised him to present to the ED for admission and ureteral stent placement.    In the ED he is afebrile with normal vital signs.  Labs show creatinine 1.06, WBC 7.0.  Urinalysis shows 6-10 WBC, 6-10 RBC, negative nitrates, negative leukocyte esterase.    History:     Past Medical History:    ADD (attention deficit disorder)    NO MEDICATION    Asthma (HCC)    SPORTS INDUCED    Attention deficit hyperactivity disorder (ADHD)    NO MEDICATION    Calculus of kidney    Hx of motion sickness    Kawasaki disease (HCC)    unspecified    Kidney stones       Past Surgical History:   Procedure Laterality Date    Lasik         Family History   Problem Relation Age of Onset    Cancer  Maternal Grandfather         prostate    Cancer Other     Breast Cancer Mother 49    Breast Cancer Maternal Grandmother 72    Heart Disease Neg     Stroke Neg        Social History     Socioeconomic History    Marital status: Single   Tobacco Use    Smoking status: Never     Passive exposure: Never    Smokeless tobacco: Never   Vaping Use    Vaping status: Never Used   Substance and Sexual Activity    Alcohol use: Yes     Comment: SOCIALLY    Drug use: Never       Medications:  No current outpatient medications on file.       Allergies:  Allergies   Allergen Reactions    Erythromycin SWELLING     EXACERBATED KAWASAKI-SWELLING, RASH, FEVER       Review of Systems:   A comprehensive 10-point review of systems was completed.  Pertinent positives and negatives are noted in the the HPI.    Physical Exam:   Vital Signs:  Blood pressure 108/70, pulse 73, temperature 97.7 °F (36.5 °C), temperature source Temporal, resp. rate 22, weight 255 lb 3.2 oz (115.8 kg), SpO2 98%.     CONSTITUTIONAL: Well developed, well nourished, in no acute distress  NEUROLOGIC: Alert and oriented  HEAD: Normocephalic, atraumatic  EYES: Sclera non-icteric  ENT: Hearing intact, moist mucous membranes  NECK: No obvious goiter or masses  RESPIRATORY: Normal respiratory effort  SKIN: No evident rashes  ABDOMEN: Soft, non-tender, non-distended, right flank tenderness    Laboratory Data:  Lab Results   Component Value Date    WBC 7.0 04/14/2024    HGB 15.0 04/14/2024    .0 04/14/2024     Lab Results   Component Value Date     04/14/2024    K 4.3 04/14/2024     04/14/2024    CO2 29.0 04/14/2024    BUN 10 04/14/2024    GLU 94 04/14/2024    GFRAA 107 07/11/2020    AST 30 04/14/2024    ALT 26 04/14/2024    TP 7.5 04/14/2024    ALB 4.6 04/14/2024    CA 10.0 04/14/2024       Urinalysis Results (last three years):  Recent Labs     03/02/23  1308 02/23/24  0141 04/03/24  0213 04/09/24  1221 04/14/24  1555   COLORUR  --  Yellow Yellow  --   Light-Yellow   CLARITY  --  Clear Clear  --  Clear   SPECGRAVITY 1.025 >=1.030 >=1.030 1.020 1.018   PHURINE  --  6.0 5.5 7.0 5.5   PROUR  --  100 mg/dL* Negative  --  Negative   GLUUR Negative Negative Negative  --  Normal   KETUR  --  15* Trace*  --  Negative   BILUR  --  Small* Negative  --  Negative   BLOODURINE  --  Large* Moderate*  --  1+*   NITRITE  --  Negative Negative Negative Negative   UROBILINOGEN  --  0.2 0.2  --  Normal   LEUUR  --  Negative Negative  --  Negative   WBCUR  --  1-5 1-5  1-5  --  6-10*   RBCUR  --  >10* 6-10*  6-10*  --  6-10*   BACUR  --  None Seen None Seen  None Seen  --  None Seen       Urine Culture Results (last three years):  Lab Results   Component Value Date    URINECUL No Growth 2 Days 04/09/2024    URINECUL No Growth at 18-24 hrs. 07/11/2020       PSA:  No results found for: \"PSA\", \"PERCENTPSA\", \"PSAS\", \"PSAULTRA\", \"QPSA\", \"PSATOT\", \"TOTPSADX\", \"TOTPSASCREEN\"     Imaging (last three days):  No results found.       Impression:   Bartolo Veliz is a 23 year old male with history of ADD seen by DOROTHY Barba recently for right distal ureteral stone.  He presented to the ED on 2/23/2024 for right flank pain and was found to have a 6 mm distal right ureteral stone.  He was discharged with medical expulsive therapy.  He returned to the ED on 4/3/2024 with recurrence of right flank pain.  CT confirmed persistence of the right distal ureteral stone.  He had no infectious signs or symptoms.  He is tentatively scheduled for definitive stone treatment with ureteroscopy and laser lithotripsy with Dr. Carlson on 4/18/2024.    Patient called me several times today with persistent severe pain refractory to Toradol, narcotics, tamsulosin.  He denies fevers or chills.  Given intractable pain I advised him to present to the ED for admission and ureteral stent placement.    In the ED he is afebrile with normal vital signs.  Labs show creatinine 1.06, WBC 7.0.  Urinalysis shows 6-10  WBC, 6-10 RBC, negative nitrates, negative leukocyte esterase.    Recommendations:   -OR today for cystoscopy, right retrograde pyelgram and ureteral stent placement  -Follow-up urine culture  -If doing well can have ureteroscopy as scheduled on 4/18/2024      I have personally reviewed all relevant medical records, labs, and imaging.    Thank you for this consult. Please call if there are any questions or concerns.    Medical Decision Making  Nephrolithiasis: Undiagnosed new problem    Amount and/or Complexity of Data Reviewed  External Data Reviewed: labs and notes.  Radiology: independent interpretation performed.    Risk  Minor surgery with no identified risk factors.        Tavares Dacosta MD  Staff Urologist  [facility]  Office: [phone]

## 2024-04-14 NOTE — ANESTHESIA PROCEDURE NOTES
Airway  Date/Time: 4/14/2024 6:49 PM  Urgency: Elective      General Information and Staff    Patient location during procedure: OR  Anesthesiologist: Archie Vásquez MD  Performed: anesthesiologist   Performed by: Archie Vásquez MD  Authorized by: Archie Vásquez MD      Indications and Patient Condition  Indications for airway management: anesthesia  Sedation level: deep  Preoxygenated: yes  Patient position: sniffing  Mask difficulty assessment: 1 - vent by mask    Final Airway Details  Final airway type: supraglottic airway      Successful airway: classic  Size 4       Number of attempts at approach: 1

## 2024-04-14 NOTE — TELEPHONE ENCOUNTER
Patient called again having persistent severe pain despite pain medications. No availability at [facility] until much later tonight, will have patient go to [facility] ED and get admitted with plans for stent placement to help relieve pain. Discussed that this will help his pain until surgery planned for this coming Thursday.    On presentation to ED recommend UA and urine culture, CBC, BMP. No new imaging needed. Admit to hospitalist for IV fluids, pain medications. Start Ceftriaxone. Will add on for right ureteral stent placement today.

## 2024-04-14 NOTE — TELEPHONE ENCOUNTER
Ureteral stone scheduled for surgery on 4/18. Having persistent pain. No fevers or chills.    Only really started taking toradol yesterday and this helps.    Offered ED visit and stent placement today vs. Continued pain management with toradol. He is feeling better at the moment after toradol so will send some more to his pharmacy and he will continue pain management, tamsulosin, PO fluids. Advised to go to ED if pain uncontrolled still or fevers/chills.

## 2024-04-14 NOTE — ED QUICK NOTES
Orders for admission, patient is aware of plan and ready to go upstairs. Any questions, please call ED RN Meagan at extension [phone].     Patient Covid vaccination status: Unvaccinated     COVID Test Ordered in ED: None    COVID Suspicion at Admission: N/A    Running Infusions:      Mental Status/LOC at time of transport: AAox4    Other pertinent information:   CIWA score: N/A   NIH score:  N/A

## 2024-04-14 NOTE — ED INITIAL ASSESSMENT (HPI)
Patient arrives to ER for evaluation of Kidney stones. Patient by urology for planned stent placement tonight. This RN sees schedule procedure for today.   Patient peeing in small amounts/frequently

## 2024-04-14 NOTE — OPERATIVE REPORT
Urology Operative Note    Attending Surgeon: Tavares Dacosta MD    Assistant Surgeon: None    Patient Name: Bartolo Veliz    Date of Surgery: 4/14/2024    Preoperative Diagnosis: Right obstructing ureteral stone    Postoperative Diagnosis: Same    Procedure Performed: Cystoscopy, right retrograde pyelogram and ureteral stent placement    Indication:  Patient is a 23 year old male who presented with a 6 mm obstructing right ureteral stone with intractable pain. The patient was counseled on options, risks, and benefits and elected to undergo the above procedure. We discussed risks including, but not limited to, bleeding, infection, damage to surrounding structures, need for repeat procedure(s) (including inability to place a stent and need for nephrostomy tube). The patient understood these risks and wished to proceed with surgery.    Findings:  Mild hydronephrosis. Distal ureteral stone visible on fluoroscopy 1-2 cm proximal to the ureteral orifice.  Initially significant to pass the wire due to the stone, but eventually this passed and a stent was placed successfully.    Procedure:  The patient was taken to the operating room and a timeout was performed confirming the correct patient and procedure. The patient was prepped and draped in lithotomy position after undergoing general anesthesia. Pre-operative prophylactic antibiotics were given in the form of ceftriaxone.    The cystoscope was inserted per urethra and the bladder was inspected and drained. The right ureter was cannulated with a Sensor wire, and the wire was passed into the renal pelvis under fluoroscopy. A 5-Czech open ended catheter was passed over the wire and into the kidney, and then the wire was removed. A hydronephrotic drip was not present.     A retrograde pyelogram was performed with contrast, showing mild hydronephrosis. The sensor wire was reinserted into the open ended catheter and into the kidney. The open ended catheter was then removed.      A 6-Iraqi x 28 cm JJ ureteral stent was inserted over the wire. The proximal coil was formed in the kidney under fluoroscopic guidance. The distal coil was formed within the bladder under direct cystoscopic visualization. The stent string was removed prior to stent placement.    The bladder was drained and the cystoscope was removed. The patient was awoken from anesthesia and transferred to PACU in stable condition. The patient tolerated the procedure well. All instrument/supply counts were correct at the end of the case.    Specimens:   None    Estimated Blood Loss:  1 mL    Tubes/Drains:  6-Iraqi x 28 cm JJ right ureteral stent    Complications:   None immediate    Condition from OR:  Stable    Plan:   The patient will follow up on 4/18/24 for definitive stone treatment via ureteroscopy, laser lithotripsy, stent exchange.      Tavares Dacosta MD  Staff Urologist  [facility]  Office: [phone]

## 2024-04-14 NOTE — H&P (VIEW-ONLY)
Urology Inpatient Consult Note  [facility]      Bartolo Veliz Patient Status:  Emergency    2000 MRN [id number]   Location [facility] EMERGENCY DEPARTMENT Attending No att. providers found   Hosp Day # 0 PCP Keira Field     Date of Admission:  2024  Date of Consult: 2024  Primary Care Provider: Keira Field     Consulting Provider: Dr. Shell    Reason for Consultation:   Nephrolithiasis    History of Present Illness:   Bartolo Veliz is a 23 year old male with history of ADD seen by DOROTHY Barba recently for right distal ureteral stone.  He presented to the ED on 2024 for right flank pain and was found to have a 6 mm distal right ureteral stone.  He was discharged with medical expulsive therapy.  He returned to the ED on 4/3/2024 with recurrence of right flank pain.  CT confirmed persistence of the right distal ureteral stone.  He had no infectious signs or symptoms.  He is tentatively scheduled for definitive stone treatment with ureteroscopy and laser lithotripsy with Dr. Carlson on 2024.    Patient called me several times today with persistent severe pain refractory to Toradol, narcotics, tamsulosin.  He denies fevers or chills.  Given intractable pain I advised him to present to the ED for admission and ureteral stent placement.    In the ED he is afebrile with normal vital signs.  Labs show creatinine 1.06, WBC 7.0.  Urinalysis shows 6-10 WBC, 6-10 RBC, negative nitrates, negative leukocyte esterase.    History:     Past Medical History:    ADD (attention deficit disorder)    NO MEDICATION    Asthma (HCC)    SPORTS INDUCED    Attention deficit hyperactivity disorder (ADHD)    NO MEDICATION    Calculus of kidney    Hx of motion sickness    Kawasaki disease (HCC)    unspecified    Kidney stones       Past Surgical History:   Procedure Laterality Date    Lasik         Family History   Problem Relation Age of Onset    Cancer  Maternal Grandfather         prostate    Cancer Other     Breast Cancer Mother 49    Breast Cancer Maternal Grandmother 72    Heart Disease Neg     Stroke Neg        Social History     Socioeconomic History    Marital status: Single   Tobacco Use    Smoking status: Never     Passive exposure: Never    Smokeless tobacco: Never   Vaping Use    Vaping status: Never Used   Substance and Sexual Activity    Alcohol use: Yes     Comment: SOCIALLY    Drug use: Never       Medications:  No current outpatient medications on file.       Allergies:  Allergies   Allergen Reactions    Erythromycin SWELLING     EXACERBATED KAWASAKI-SWELLING, RASH, FEVER       Review of Systems:   A comprehensive 10-point review of systems was completed.  Pertinent positives and negatives are noted in the the HPI.    Physical Exam:   Vital Signs:  Blood pressure 108/70, pulse 73, temperature 97.7 °F (36.5 °C), temperature source Temporal, resp. rate 22, weight 255 lb 3.2 oz (115.8 kg), SpO2 98%.     CONSTITUTIONAL: Well developed, well nourished, in no acute distress  NEUROLOGIC: Alert and oriented  HEAD: Normocephalic, atraumatic  EYES: Sclera non-icteric  ENT: Hearing intact, moist mucous membranes  NECK: No obvious goiter or masses  RESPIRATORY: Normal respiratory effort  SKIN: No evident rashes  ABDOMEN: Soft, non-tender, non-distended, right flank tenderness    Laboratory Data:  Lab Results   Component Value Date    WBC 7.0 04/14/2024    HGB 15.0 04/14/2024    .0 04/14/2024     Lab Results   Component Value Date     04/14/2024    K 4.3 04/14/2024     04/14/2024    CO2 29.0 04/14/2024    BUN 10 04/14/2024    GLU 94 04/14/2024    GFRAA 107 07/11/2020    AST 30 04/14/2024    ALT 26 04/14/2024    TP 7.5 04/14/2024    ALB 4.6 04/14/2024    CA 10.0 04/14/2024       Urinalysis Results (last three years):  Recent Labs     03/02/23  1308 02/23/24  0141 04/03/24  0213 04/09/24  1221 04/14/24  1555   COLORUR  --  Yellow Yellow  --   Light-Yellow   CLARITY  --  Clear Clear  --  Clear   SPECGRAVITY 1.025 >=1.030 >=1.030 1.020 1.018   PHURINE  --  6.0 5.5 7.0 5.5   PROUR  --  100 mg/dL* Negative  --  Negative   GLUUR Negative Negative Negative  --  Normal   KETUR  --  15* Trace*  --  Negative   BILUR  --  Small* Negative  --  Negative   BLOODURINE  --  Large* Moderate*  --  1+*   NITRITE  --  Negative Negative Negative Negative   UROBILINOGEN  --  0.2 0.2  --  Normal   LEUUR  --  Negative Negative  --  Negative   WBCUR  --  1-5 1-5  1-5  --  6-10*   RBCUR  --  >10* 6-10*  6-10*  --  6-10*   BACUR  --  None Seen None Seen  None Seen  --  None Seen       Urine Culture Results (last three years):  Lab Results   Component Value Date    URINECUL No Growth 2 Days 04/09/2024    URINECUL No Growth at 18-24 hrs. 07/11/2020       PSA:  No results found for: \"PSA\", \"PERCENTPSA\", \"PSAS\", \"PSAULTRA\", \"QPSA\", \"PSATOT\", \"TOTPSADX\", \"TOTPSASCREEN\"     Imaging (last three days):  No results found.       Impression:   Bartolo Veliz is a 23 year old male with history of ADD seen by DOROTHY Barba recently for right distal ureteral stone.  He presented to the ED on 2/23/2024 for right flank pain and was found to have a 6 mm distal right ureteral stone.  He was discharged with medical expulsive therapy.  He returned to the ED on 4/3/2024 with recurrence of right flank pain.  CT confirmed persistence of the right distal ureteral stone.  He had no infectious signs or symptoms.  He is tentatively scheduled for definitive stone treatment with ureteroscopy and laser lithotripsy with Dr. Carlson on 4/18/2024.    Patient called me several times today with persistent severe pain refractory to Toradol, narcotics, tamsulosin.  He denies fevers or chills.  Given intractable pain I advised him to present to the ED for admission and ureteral stent placement.    In the ED he is afebrile with normal vital signs.  Labs show creatinine 1.06, WBC 7.0.  Urinalysis shows 6-10  WBC, 6-10 RBC, negative nitrates, negative leukocyte esterase.    Recommendations:   -OR today for cystoscopy, right retrograde pyelgram and ureteral stent placement  -Follow-up urine culture  -If doing well can have ureteroscopy as scheduled on 4/18/2024      I have personally reviewed all relevant medical records, labs, and imaging.    Thank you for this consult. Please call if there are any questions or concerns.    Medical Decision Making  Nephrolithiasis: Undiagnosed new problem    Amount and/or Complexity of Data Reviewed  External Data Reviewed: labs and notes.  Radiology: independent interpretation performed.    Risk  Minor surgery with no identified risk factors.        Tavares Dacosta MD  Staff Urologist  [facility]  Office: [phone]

## 2024-04-15 VITALS
OXYGEN SATURATION: 97 % | TEMPERATURE: 97 F | WEIGHT: 255.31 LBS | RESPIRATION RATE: 18 BRPM | SYSTOLIC BLOOD PRESSURE: 118 MMHG | HEART RATE: 54 BPM | BODY MASS INDEX: 29 KG/M2 | DIASTOLIC BLOOD PRESSURE: 62 MMHG

## 2024-04-15 LAB
ANION GAP SERPL CALC-SCNC: 3 MMOL/L (ref 0–18)
BUN BLD-MCNC: 13 MG/DL (ref 9–23)
BUN/CREAT SERPL: 12.6 (ref 10–20)
CALCIUM BLD-MCNC: 9.7 MG/DL (ref 8.7–10.4)
CHLORIDE SERPL-SCNC: 107 MMOL/L (ref 98–112)
CO2 SERPL-SCNC: 27 MMOL/L (ref 21–32)
CREAT BLD-MCNC: 1.03 MG/DL
DEPRECATED RDW RBC AUTO: 37.3 FL (ref 35.1–46.3)
EGFRCR SERPLBLD CKD-EPI 2021: 103 ML/MIN/1.73M2 (ref 60–?)
ERYTHROCYTE [DISTWIDTH] IN BLOOD BY AUTOMATED COUNT: 11.9 % (ref 11–15)
GLUCOSE BLD-MCNC: 114 MG/DL (ref 70–99)
HCT VFR BLD AUTO: 41.3 %
HGB BLD-MCNC: 14.4 G/DL
MAGNESIUM SERPL-MCNC: 1.7 MG/DL (ref 1.6–2.6)
MCH RBC QN AUTO: 30 PG (ref 26–34)
MCHC RBC AUTO-ENTMCNC: 34.9 G/DL (ref 31–37)
MCV RBC AUTO: 86 FL
OSMOLALITY SERPL CALC.SUM OF ELEC: 285 MOSM/KG (ref 275–295)
PLATELET # BLD AUTO: 213 10(3)UL (ref 150–450)
POTASSIUM SERPL-SCNC: 4.3 MMOL/L (ref 3.5–5.1)
RBC # BLD AUTO: 4.8 X10(6)UL
SODIUM SERPL-SCNC: 137 MMOL/L (ref 136–145)
WBC # BLD AUTO: 10.3 X10(3) UL (ref 4–11)

## 2024-04-15 PROCEDURE — 99231 SBSQ HOSP IP/OBS SF/LOW 25: CPT | Performed by: PHYSICIAN ASSISTANT

## 2024-04-15 PROCEDURE — 99239 HOSP IP/OBS DSCHRG MGMT >30: CPT | Performed by: HOSPITALIST

## 2024-04-15 RX ORDER — PHENAZOPYRIDINE HYDROCHLORIDE 100 MG/1
100 TABLET, FILM COATED ORAL
Status: DISCONTINUED | OUTPATIENT
Start: 2024-04-15 | End: 2024-04-15

## 2024-04-15 RX ORDER — MAGNESIUM OXIDE 400 MG/1
400 TABLET ORAL ONCE
Status: COMPLETED | OUTPATIENT
Start: 2024-04-15 | End: 2024-04-15

## 2024-04-15 NOTE — DISCHARGE SUMMARY
[facility]  part of [facility]    Discharge Summary    Bartolo Veliz Patient Status:  Inpatient    2000 MRN [id number]   Location [facility] 4W/SW/SE Attending Dalila Schroeder MD   Hosp Day # 1 PCP Keira Field     Date of Admission: 2024      Date of Discharge: 04/15/24      Admitting Diagnosis: Ureteral stone [N20.1]    Hospital Discharge Diagnoses:  Ureteral stone    Lace+ Score: 50  59-90 High Risk  29-58 Medium Risk  0-28   Low Risk.    TCM Follow-Up Recommendation:  LACE 29-58: Moderate Risk of readmission after discharge from the hospital.          Problem List:   Patient Active Problem List   Diagnosis    Gynecomastia    Family history of breast cancer in mother at 49    Breast mass in male    Family history of BRCA gene mutation    Recurrent cold sores    Ureteral stone         Physical Exam:     Gen: No acute distress  Pulm: Lungs clear, normal respiratory effort  CV: Heart with regular rate and rhythm  Abd: Abdomen soft, nontender, nondistended, bowel sounds present  Neuro: No acute focal deficits  MSK: Full range of motion in extremities  Skin: Warm and dry  Psych: Normal affect  Ext: no c/c/e      History of Present Illness: Bartolo Veliz is a(n) 23 year old male with a PMH of ADD, sports induced asthma and recent diagnosis of right distal ureteral stent who was scheduled as outpt for ureteroscopy and laser lithotripsy with Dr Carlson on 24 who presents with persistent severe right flank pain.  He came to the ED for further evaluation per urology recommendation.  In the ER his pain was controlled and he will be admitted for ureteral stent placement today with Dr Dacosta.     Hospital Course:     Right Ureteral stone  - urology consulted  - plan ureteral stent placement - done 24  - given IV ceftriaxone - home on bactrim    Discharge Condition: Stable    Discharge Medications:      Discharge Medications        START taking these medications         Instructions Prescription details   oxybutynin 5 MG Tabs  Commonly known as: Ditropan      Take 1 tablet (5 mg total) by mouth 3 (three) times daily as needed (Bladder spasms). Stop 12 hours before Jarquin catheter removal in clinic (if applicable)   Quantity: 15 tablet  Refills: 0     phenazopyridine 100 MG Tabs  Commonly known as: Pyridium      Take 1 tablet (100 mg total) by mouth 3 (three) times daily as needed for Pain. This will turn your urine orange.   Quantity: 10 tablet  Refills: 0     sulfamethoxazole-trimethoprim -160 MG Tabs per tablet  Commonly known as: Bactrim DS      Take 1 tablet by mouth 2 (two) times daily for 3 days.   Stop taking on: April 17, 2024  Quantity: 6 tablet  Refills: 0            ASK your doctor about these medications        Instructions Prescription details   HYDROcodone-acetaminophen 5-325 MG Tabs  Commonly known as: Norco  Ask about: Should I take this medication?      Take 1-2 tablets by mouth every 4 (four) hours as needed for Pain.   Stop taking on: April 14, 2024  Quantity: 20 tablet  Refills: 0     Ketorolac Tromethamine 10 MG Tabs  Commonly known as: TORADOL  Ask about: Should I take this medication?      Take 1 tablet (10 mg total) by mouth every 6 (six) hours as needed for Pain.   Stop taking on: April 14, 2024  Quantity: 12 tablet  Refills: 0     ondansetron 4 MG Tbdp  Commonly known as: Zofran-ODT  Ask about: Should I take this medication?      Take 1 tablet (4 mg total) by mouth every 4 (four) hours as needed for Nausea.   Stop taking on: April 14, 2024  Quantity: 10 tablet  Refills: 0     polyethylene glycol (PEG 3350) 17 g Pack  Commonly known as: Miralax  Ask about: Should I take this medication?      Take 17 g by mouth daily as needed.   Stop taking on: April 14, 2024  Quantity: 12 each  Refills: 0     tamsulosin 0.4 MG Caps  Commonly known as: Flomax  Ask about: Should I take this medication?      Take 1 capsule (0.4 mg total) by mouth daily for 7 days.    Stop taking on: April 14, 2024  Quantity: 7 capsule  Refills: 0               Where to Get Your Medications        These medications were sent to [id number] - [place], [place] - [address] AT [facility] [address], [phone], [phone] [address] , [facility] [address]      Phone: [phone]   oxybutynin 5 MG Tabs  phenazopyridine 100 MG Tabs  sulfamethoxazole-trimethoprim -160 MG Tabs per tablet             Dalila Schroeder MD  4/15/2024  9:22 AM    Greater than 30 minutes spent on preparation and coordination of this discharge

## 2024-04-15 NOTE — DISCHARGE INSTRUCTIONS
You had cystoscopy and stent in the operating room today.    Instructions:    - No heavy lifting or strenuous activity for 1 day. You may resume regular activity tomorrow.  With increased activity you may notice more blood in the urine from stent movement inside the bladder.    -You should follow-up as planned for scheduled surgery on 4/18/2024 with Dr. Carlson.    - With a ureteral stent in place you may have some discomfort on your side/flank. You can feel pain worsen when you urinate, so try to avoid holding urine and void every 2-3 hours. You also may notice increased blood in the urine as you increase your activity. If this happens increase your hydration and take it easy for a day. Warm baths/hot packs can help with the discomfort as well as your prescribed medications and Advil/Motrin (if you are able to take these).     - You may experience mild pain after the procedure for a few days.  If the pain becomes intolerable please contact our office or go to the nearest Emergency Room or Urgent Care. You should take over the counter ibuprofen (AKA motrin, advil) for mild pain (provided you do not have a medical condition such as stomach ulcers or kidney disease which prohibits you from taking these). You may alternate this with tylenol as well. If pain is still not relieved by tylenol and/or ibuprofen, you may take narcotic pain medication if prescribed (typically oxycodone or tramadol). If you are taking narcotic pain medication this can make you constipated, so you should take over the counter stool softeners or miralax if prescribed.    - Warm packs over the lower abdomen or hot baths often help with discomfort after cystoscopy.    - If you take blood thinners (such as aspirin or plavix) please hold these medications until 3 days after surgery.     - You may experience burning and frequency of urination over the next few days. This will improve after a few days if you stay well hydrated.      - You are likely to  see some blood in your urine (pink or light red urine) that should clear up within a few days. Staying well hydrated should help this clear up. If you notice the urine stays dark red or there are multiple large blood clots despite good hydration, please call the urology clinic ([phone]).     - Try to abstain from alcohol, coffee, tea, artificial sweeteners, and spicy food for the next 48 hours as these can irritate the bladder.     - If you develop fevers / chills, difficulty urinating, or abdominal pain that does not improve with pain medications, please call the office.     - Drink 1.5 to 2 liters of fluid today (water is preferable). If you are on a fluid restriction due to other medical reasons then you need to adhere to your fluid restriction recommendations.    Tavares Dacosta MD  Staff Urologist  [facility]  Office: [phone]

## 2024-04-15 NOTE — PLAN OF CARE
Patient has safety precautions in place bed in the lowest position, bed alarm on, and call light within reach. Plan of care ongoing. No further concerns as of present.    Problem: Patient Centered Care  Goal: Patient preferences are identified and integrated in the patient's plan of care  Description: Interventions:    - Provide timely, complete, and accurate information to patient/family  - Incorporate patient and family knowledge, values, beliefs, and cultural backgrounds into the planning and delivery of care  - Encourage patient/family to participate in care and decision-making at the level they choose  - Honor patient and family perspectives and choices  Outcome: Progressing    Problem: GASTROINTESTINAL - ADULT  Goal: Minimal or absence of nausea and vomiting  Description: INTERVENTIONS:  - Maintain adequate hydration with IV or PO as ordered and tolerated  - Nasogastric tube to low intermittent suction as ordered  - Evaluate effectiveness of ordered antiemetic medications  - Provide nonpharmacologic comfort measures as appropriate  - Advance diet as tolerated, if ordered  - Obtain nutritional consult as needed  - Evaluate fluid balance  Outcome: Progressing     Problem: PAIN - ADULT  Goal: Verbalizes/displays adequate comfort level or patient's stated pain goal  Description: INTERVENTIONS:  - Encourage pt to monitor pain and request assistance  - Assess pain using appropriate pain scale  - Administer analgesics based on type and severity of pain and evaluate response  - Implement non-pharmacological measures as appropriate and evaluate response  - Consider cultural and social influences on pain and pain management  - Manage/alleviate anxiety  - Utilize distraction and/or relaxation techniques  - Monitor for opioid side effects  - Notify MD/LIP if interventions unsuccessful or patient reports new pain  - Anticipate increased pain with activity and pre-medicate as appropriate  Outcome: Progressing     Problem:  DISCHARGE PLANNING  Goal: Discharge to home or other facility with appropriate resources  Description: INTERVENTIONS:  - Identify barriers to discharge w/pt and caregiver  - Include patient/family/discharge partner in discharge planning  - Arrange for needed discharge resources and transportation as appropriate  - Identify discharge learning needs (meds, wound care, etc)  - Arrange for interpreters to assist at discharge as needed  - Consider post-discharge preferences of patient/family/discharge partner  - Complete POLST form as appropriate  - Assess patient's ability to be responsible for managing their own health  - Refer to Case Management Department for coordinating discharge planning if the patient needs post-hospital services based on physician/LIP order or complex needs related to functional status, cognitive ability or social support system  Outcome: Progressing

## 2024-04-15 NOTE — PROGRESS NOTES
Successful right ureteral stent placement.  If he wakes up well and is feeling well postoperatively, okay for discharge tonight from urology perspective.  Okay for diet.  Will plan to return on 4/18/2024 for planned ureteroscopy with Dr. Carlson.  Prescriptions sent to pharmacy.

## 2024-04-15 NOTE — PLAN OF CARE
Patient A&Ox4. Up independently. Pain medications provided as needed. Encouraged adequate fluid intake and ambulation. Voiding via urinal and straining urine due to patient having kidney stones. Patient cleared for discharge. Going home. Verified that pain medications are at patient's pharmacy. IV removed, discharge education provided, patient sent home with all personal belongings, and discharge instructions. Addressed additional questions.      Problem: Patient Centered Care  Goal: Patient preferences are identified and integrated in the patient's plan of care  Description: Interventions:  - What would you like us to know as we care for you?   - Provide timely, complete, and accurate information to patient/family  - Incorporate patient and family knowledge, values, beliefs, and cultural backgrounds into the planning and delivery of care  - Encourage patient/family to participate in care and decision-making at the level they choose  - Honor patient and family perspectives and choices  Outcome: Adequate for Discharge     Problem: GASTROINTESTINAL - ADULT  Goal: Minimal or absence of nausea and vomiting  Description: INTERVENTIONS:  - Maintain adequate hydration with IV or PO as ordered and tolerated  - Nasogastric tube to low intermittent suction as ordered  - Evaluate effectiveness of ordered antiemetic medications  - Provide nonpharmacologic comfort measures as appropriate  - Advance diet as tolerated, if ordered  - Obtain nutritional consult as needed  - Evaluate fluid balance  Outcome: Adequate for Discharge     Problem: PAIN - ADULT  Goal: Verbalizes/displays adequate comfort level or patient's stated pain goal  Description: INTERVENTIONS:  - Encourage pt to monitor pain and request assistance  - Assess pain using appropriate pain scale  - Administer analgesics based on type and severity of pain and evaluate response  - Implement non-pharmacological measures as appropriate and evaluate response  - Consider  cultural and social influences on pain and pain management  - Manage/alleviate anxiety  - Utilize distraction and/or relaxation techniques  - Monitor for opioid side effects  - Notify MD/LIP if interventions unsuccessful or patient reports new pain  - Anticipate increased pain with activity and pre-medicate as appropriate  Outcome: Adequate for Discharge     Problem: DISCHARGE PLANNING  Goal: Discharge to home or other facility with appropriate resources  Description: INTERVENTIONS:  - Identify barriers to discharge w/pt and caregiver  - Include patient/family/discharge partner in discharge planning  - Arrange for needed discharge resources and transportation as appropriate  - Identify discharge learning needs (meds, wound care, etc)  - Arrange for interpreters to assist at discharge as needed  - Consider post-discharge preferences of patient/family/discharge partner  - Complete POLST form as appropriate  - Assess patient's ability to be responsible for managing their own health  - Refer to Case Management Department for coordinating discharge planning if the patient needs post-hospital services based on physician/LIP order or complex needs related to functional status, cognitive ability or social support system  Outcome: Adequate for Discharge

## 2024-04-15 NOTE — ANESTHESIA POSTPROCEDURE EVALUATION
Patient: Bartolo Veliz    Procedure Summary       Date: 04/14/24 Room / Location: [facility] MAIN OR 14 / [facility] MAIN OR    Anesthesia Start: 1841 Anesthesia Stop: 1924    Procedure: CYSTOSCOPY, RIGHT RETROGRADE PYELOGRAM, INSERTION OF RIGHT URETERAL STENT (Right) Diagnosis:       Nephrolithiasis      (Right Nephrolithiasis [N20.0])    Surgeons: Tavares Dacosta MD Anesthesiologist: Omar Vásquez MD    Anesthesia Type: general ASA Status: 2            Anesthesia Type: general    Vitals Value Taken Time   /66 04/14/24 1922   Temp 98.1 04/14/24 1924   Pulse 65 04/14/24 1923   Resp 7 04/14/24 1923   SpO2 95 % 04/14/24 1923   Vitals shown include unfiled device data.    [facility] AN Post Evaluation:   Patient Evaluated in PACU  Patient Participation: complete - patient participated  Level of Consciousness: awake and alert  Pain Management: adequate  Airway Patency:patent  Dental exam unchanged from preop  Yes    Nausea/Vomiting: none  Cardiovascular Status: acceptable  Respiratory Status: acceptable  Postoperative Hydration acceptable      OMAR VÁSQUEZ MD  4/14/2024 7:24 PM

## 2024-04-15 NOTE — PROGRESS NOTES
[facility]  part of [facility]    Progress Note    Bartolo Veliz Patient Status:  Inpatient    2000 MRN [id number]   Location [facility] 4W/SW/SE Attending Dalila Schroeder MD   Hosp Day # 1 PCP Keira Field     Subjective:   Bartolo Veliz is a(n) 23 year old male s/p cystoscopy, right ureteral stent placement 24.    No overnight events.  No fevers, stable labs reviewed.   Patient feeling well, mild dysuria and c/o constipation.     Objective:   Blood pressure 118/62, pulse 54, temperature 97.4 °F (36.3 °C), temperature source Oral, resp. rate 18, weight 255 lb 4.7 oz (115.8 kg), SpO2 97%.    No distress  Non labored respirations  Abdomen soft, non tender    Results:   Lab Results   Component Value Date    WBC 10.3 04/15/2024    HGB 14.4 04/15/2024    HCT 41.3 04/15/2024    .0 04/15/2024    CREATSERUM 1.03 04/15/2024    BUN 13 04/15/2024     04/15/2024    K 4.3 04/15/2024     04/15/2024    CO2 27.0 04/15/2024     (H) 04/15/2024    CA 9.7 04/15/2024    ALB 4.6 2024    ALKPHO 53 2024    BILT 0.9 2024    TP 7.5 2024    AST 30 2024    ALT 26 2024    TSH 0.761 2020    LIP 47 (L) 2020    ESRML 6 2018    CRP <0.29 2018    MG 1.7 04/15/2024       XR OR - N/C    Result Date: 2024  CONCLUSION: See above.   Dictated by (CST): Aaron Goldstein MD on 2024 at 7:44 PM     Finalized by (CST): Aaron Goldstein MD on 2024 at 7:45 PM               Assessment & Plan:   Ureteral stone  S/p cystoscopy, right ureteral stent placement 24  AF, HD stable  Labs reviewed, unremarkable  No evidence of infection    PLAN - OK to d'c from  perspective with plan to proceed with ureteroscopy as planned 24 with Dr. Carlson.    Reviewed above with patient and nursing staff.     Mary Cristina PA-C  [facility] Urology  4/15/2024

## 2024-04-17 ENCOUNTER — TELEPHONE (OUTPATIENT)
Dept: SURGERY | Facility: CLINIC | Age: 24
End: 2024-04-17

## 2024-04-17 NOTE — TELEPHONE ENCOUNTER
Occupational Therapy Lymphedema Note      Visit Count:  10  Plan of Care Dates: Initial: 8/30/2017 Through: 11/22/2017    Next Referring Provider Visit: TBD  Referred by: Marguerite Reese DO     Medical Diagnosis (from order):  Bilateral lower extremity lymphedema   Treatment Diagnosis: Lymphedema of Bilateral Lower Extremity  Insurance: 1. [employer] MEDICARE COMPLETE   [employer]: PER WEB ACTIVE, $40 COPAY/PER VISIT, VISITS ARE BASED ON MEDICAL NECESSITY, PRIOR AUTHORIZATION IS NOT REQUIRED-      Date of Onset: 7-19-17  Diagnosis Precautions:right rotator cuff repair    Chart reviewed: Relevant co-morbidities, allergies, tests and medications    SUBJECTIVE   Reports continued slippage of the Comprilan wraps though now able to keep them on \"for most of the day\"  Pain in anterior legs 2-3:10    OBJECTIVE   Note mild skin irritation at the right dorsal foot from the kinesiotape, therefore not used today    manual therapy of manual lymphatic drainage per LE protocol, including stim to abdominal, inguinal, popliteal, and achilles lymph nodes and of LE pump points.  Re-dressed LEs with size F tubigrips overlain by one roll (each leg) of 8 cm Comprilan. educated pt on applying the Comprilan just above the superior calf to provide support and prevent distal slippage of the wraps as well as folding the proximal end over the proximal tubigrip layer for improved stability      Exercise: upper and lower body exs, truncal exs, abdominal breathing during MLD to facilitate uptake of lymphatic fluid   Finalized home exercise program for manual lymphatic drainage as pt's reach allows (related to body habitus)     ASSESSMENT/PLAN:   Doing well in al areas-will decrease to once a week and re-assess in 3-4 weeks, at which oiunt will likely transition to compression garments        Goals:  To be obtained by end of this plan of care:  1. Patient independent with modified and progressed home exercise program.  2. Patient will be  Jesika  calling stating patient  needs a call to explain clearly of the instructions with medications  he needs to have done before surgery (surgery date is 4/18 with Dr Carlson .Please advise     Please call patient   [phone]   independent with home exercise program of self-massage, exercise and bandaging to prevent edema refill/as a therapy adjunct.   3. Patient will report a significant pain decrease, to 2/10, allowing patient to complete a full night's uninterrupted sleep.   4. Patient to lose significant girth in BLE's to allow patient to lift legs for donning LB clothing.   5. Patient to achieve reduction of lymphedema to allow measure and fit of custom or over the counter garment.   6. Patient to be independent in donning/doffing compression garments necessary for maintenance of lymphedema.   7. Patient to be instructed in safely precautions and verbalize five ways to protect the upper/lower extremity to prevent future infections.          The plan of care and goals were established with the patient who concurs.  Patient has been given attendance policy at time of initial evaluation.    Patient Education:  Who will be receiving education: patient  Are they ready to learn: yes  Preferred learning style: written, verbal, demonstration  Barriers to learning: no barriers apparent at this time   Result of initial outlined education: Verbalizes understanding, Demonstrates understanding and Needs reinforcement    THERAPY DAILY BILLING   Primary Insurance: [facility] MEDICARE COMPLETE    Timed Procedures:  Therapeutic Exercise, 15 minutes  Manual Therapy, 20 minutes  Total Treatment Time: 35 minutes        Outcome Measures: (Outcome Scoring)  Per the Lymphedema Life Impact scale, in which the pt scored a 44, the following G-codes are applied:    : Current Carrying/Moving/Handling Objects Limitation,  CL  : Goal Carrying/Moving/Handling Objects Limitation,  CI   Modifier based on outcome measure(s)/functional testing/clinical judgement as listed above    The referring provider's electronic or written signature on the evaluation authorizes the therapy plan of care and certifies the need for these services, furnished under this plan of care  while under their care.  Electronically sent for physician signature

## 2024-04-17 NOTE — TELEPHONE ENCOUNTER
Called pt to discuss below.   Pt had questions regarding what medications to take prior to procedure.   Advised pt to contact pre admission testing.  Number given.

## 2024-04-18 ENCOUNTER — HOSPITAL ENCOUNTER (OUTPATIENT)
Facility: HOSPITAL | Age: 24
Setting detail: HOSPITAL OUTPATIENT SURGERY
Discharge: HOME | End: 2024-04-18
Attending: UROLOGY | Admitting: UROLOGY
Payer: COMMERCIAL

## 2024-04-18 ENCOUNTER — APPOINTMENT (OUTPATIENT)
Dept: GENERAL RADIOLOGY | Facility: HOSPITAL | Age: 24
End: 2024-04-18
Attending: UROLOGY
Payer: COMMERCIAL

## 2024-04-18 ENCOUNTER — TELEPHONE (OUTPATIENT)
Dept: SURGERY | Facility: CLINIC | Age: 24
End: 2024-04-18

## 2024-04-18 ENCOUNTER — ANESTHESIA (OUTPATIENT)
Dept: SURGERY | Facility: HOSPITAL | Age: 24
End: 2024-04-18
Payer: COMMERCIAL

## 2024-04-18 ENCOUNTER — ANESTHESIA EVENT (OUTPATIENT)
Dept: SURGERY | Facility: HOSPITAL | Age: 24
End: 2024-04-18
Payer: COMMERCIAL

## 2024-04-18 VITALS
HEART RATE: 56 BPM | WEIGHT: 260.13 LBS | RESPIRATION RATE: 20 BRPM | BODY MASS INDEX: 30.1 KG/M2 | OXYGEN SATURATION: 100 % | TEMPERATURE: 98 F | SYSTOLIC BLOOD PRESSURE: 117 MMHG | DIASTOLIC BLOOD PRESSURE: 64 MMHG | HEIGHT: 78 IN

## 2024-04-18 DIAGNOSIS — N20.0 KIDNEY STONE: ICD-10-CM

## 2024-04-18 DIAGNOSIS — N20.1 URETEROLITHIASIS: ICD-10-CM

## 2024-04-18 DIAGNOSIS — N20.1 URETERAL STONE: ICD-10-CM

## 2024-04-18 DIAGNOSIS — N20.0 NEPHROLITHIASIS: ICD-10-CM

## 2024-04-18 PROCEDURE — 74420 UROGRAPHY RTRGR +-KUB: CPT | Performed by: UROLOGY

## 2024-04-18 PROCEDURE — 0T768DZ DILATION OF RIGHT URETER WITH INTRALUMINAL DEVICE, VIA NATURAL OR ARTIFICIAL OPENING ENDOSCOPIC: ICD-10-PCS | Performed by: UROLOGY

## 2024-04-18 PROCEDURE — 0TC08ZZ EXTIRPATION OF MATTER FROM RIGHT KIDNEY, VIA NATURAL OR ARTIFICIAL OPENING ENDOSCOPIC: ICD-10-PCS | Performed by: UROLOGY

## 2024-04-18 PROCEDURE — 0TC68ZZ EXTIRPATION OF MATTER FROM RIGHT URETER, VIA NATURAL OR ARTIFICIAL OPENING ENDOSCOPIC: ICD-10-PCS | Performed by: UROLOGY

## 2024-04-18 PROCEDURE — 52356 CYSTO/URETERO W/LITHOTRIPSY: CPT | Performed by: UROLOGY

## 2024-04-18 DEVICE — URETERAL STENT
Type: IMPLANTABLE DEVICE | Site: URETER | Status: FUNCTIONAL
Brand: ASCERTA™

## 2024-04-18 RX ORDER — PHENAZOPYRIDINE HYDROCHLORIDE 100 MG/1
100 TABLET, FILM COATED ORAL 3 TIMES DAILY PRN
Qty: 15 TABLET | Refills: 0 | Status: SHIPPED | OUTPATIENT
Start: 2024-04-18

## 2024-04-18 RX ORDER — MORPHINE SULFATE 2 MG/ML
2 INJECTION, SOLUTION INTRAMUSCULAR; INTRAVENOUS EVERY 10 MIN PRN
Status: DISCONTINUED | OUTPATIENT
Start: 2024-04-18 | End: 2024-04-18

## 2024-04-18 RX ORDER — NALOXONE HYDROCHLORIDE 0.4 MG/ML
80 INJECTION, SOLUTION INTRAMUSCULAR; INTRAVENOUS; SUBCUTANEOUS AS NEEDED
Status: DISCONTINUED | OUTPATIENT
Start: 2024-04-18 | End: 2024-04-18

## 2024-04-18 RX ORDER — NALOXONE HYDROCHLORIDE 0.4 MG/ML
0.08 INJECTION, SOLUTION INTRAMUSCULAR; INTRAVENOUS; SUBCUTANEOUS AS NEEDED
Status: DISCONTINUED | OUTPATIENT
Start: 2024-04-18 | End: 2024-04-18

## 2024-04-18 RX ORDER — ONDANSETRON 2 MG/ML
INJECTION INTRAMUSCULAR; INTRAVENOUS
Status: COMPLETED
Start: 2024-04-18 | End: 2024-04-18

## 2024-04-18 RX ORDER — SODIUM CHLORIDE, SODIUM LACTATE, POTASSIUM CHLORIDE, CALCIUM CHLORIDE 600; 310; 30; 20 MG/100ML; MG/100ML; MG/100ML; MG/100ML
INJECTION, SOLUTION INTRAVENOUS CONTINUOUS
Status: DISCONTINUED | OUTPATIENT
Start: 2024-04-18 | End: 2024-04-18

## 2024-04-18 RX ORDER — MORPHINE SULFATE 4 MG/ML
4 INJECTION, SOLUTION INTRAMUSCULAR; INTRAVENOUS EVERY 10 MIN PRN
Status: DISCONTINUED | OUTPATIENT
Start: 2024-04-18 | End: 2024-04-18

## 2024-04-18 RX ORDER — HYDROMORPHONE HYDROCHLORIDE 1 MG/ML
0.2 INJECTION, SOLUTION INTRAMUSCULAR; INTRAVENOUS; SUBCUTANEOUS EVERY 5 MIN PRN
Status: DISCONTINUED | OUTPATIENT
Start: 2024-04-18 | End: 2024-04-18

## 2024-04-18 RX ORDER — HYDROMORPHONE HYDROCHLORIDE 1 MG/ML
0.6 INJECTION, SOLUTION INTRAMUSCULAR; INTRAVENOUS; SUBCUTANEOUS EVERY 5 MIN PRN
Status: DISCONTINUED | OUTPATIENT
Start: 2024-04-18 | End: 2024-04-18

## 2024-04-18 RX ORDER — ACETAMINOPHEN 500 MG
1000 TABLET ORAL ONCE AS NEEDED
Status: COMPLETED | OUTPATIENT
Start: 2024-04-18 | End: 2024-04-18

## 2024-04-18 RX ORDER — HYDROMORPHONE HYDROCHLORIDE 1 MG/ML
0.4 INJECTION, SOLUTION INTRAMUSCULAR; INTRAVENOUS; SUBCUTANEOUS EVERY 5 MIN PRN
Status: DISCONTINUED | OUTPATIENT
Start: 2024-04-18 | End: 2024-04-18

## 2024-04-18 RX ORDER — DEXAMETHASONE SODIUM PHOSPHATE 4 MG/ML
VIAL (ML) INJECTION AS NEEDED
Status: DISCONTINUED | OUTPATIENT
Start: 2024-04-18 | End: 2024-04-18 | Stop reason: SURG

## 2024-04-18 RX ORDER — SCOLOPAMINE TRANSDERMAL SYSTEM 1 MG/1
1 PATCH, EXTENDED RELEASE TRANSDERMAL ONCE
Status: DISCONTINUED | OUTPATIENT
Start: 2024-04-18 | End: 2024-04-18 | Stop reason: HOSPADM

## 2024-04-18 RX ORDER — LIDOCAINE HYDROCHLORIDE 20 MG/ML
JELLY TOPICAL AS NEEDED
Status: DISCONTINUED | OUTPATIENT
Start: 2024-04-18 | End: 2024-04-18 | Stop reason: HOSPADM

## 2024-04-18 RX ORDER — ONDANSETRON 2 MG/ML
4 INJECTION INTRAMUSCULAR; INTRAVENOUS EVERY 6 HOURS PRN
Status: DISCONTINUED | OUTPATIENT
Start: 2024-04-18 | End: 2024-04-18

## 2024-04-18 RX ORDER — DIPHENHYDRAMINE HYDROCHLORIDE 50 MG/ML
12.5 INJECTION INTRAMUSCULAR; INTRAVENOUS AS NEEDED
Status: DISCONTINUED | OUTPATIENT
Start: 2024-04-18 | End: 2024-04-18

## 2024-04-18 RX ORDER — MIDAZOLAM HYDROCHLORIDE 1 MG/ML
INJECTION INTRAMUSCULAR; INTRAVENOUS AS NEEDED
Status: DISCONTINUED | OUTPATIENT
Start: 2024-04-18 | End: 2024-04-18 | Stop reason: SURG

## 2024-04-18 RX ORDER — ALBUTEROL SULFATE 2.5 MG/3ML
2.5 SOLUTION RESPIRATORY (INHALATION) AS NEEDED
Status: DISCONTINUED | OUTPATIENT
Start: 2024-04-18 | End: 2024-04-18

## 2024-04-18 RX ORDER — MEPERIDINE HYDROCHLORIDE 25 MG/ML
12.5 INJECTION INTRAMUSCULAR; INTRAVENOUS; SUBCUTANEOUS AS NEEDED
Status: DISCONTINUED | OUTPATIENT
Start: 2024-04-18 | End: 2024-04-18

## 2024-04-18 RX ORDER — MORPHINE SULFATE 10 MG/ML
6 INJECTION, SOLUTION INTRAMUSCULAR; INTRAVENOUS EVERY 10 MIN PRN
Status: DISCONTINUED | OUTPATIENT
Start: 2024-04-18 | End: 2024-04-18

## 2024-04-18 RX ORDER — TAMSULOSIN HYDROCHLORIDE 0.4 MG/1
0.4 CAPSULE ORAL DAILY
Qty: 10 CAPSULE | Refills: 0 | Status: SHIPPED | OUTPATIENT
Start: 2024-04-18 | End: 2024-04-28

## 2024-04-18 RX ORDER — HYDROCODONE BITARTRATE AND ACETAMINOPHEN 5; 325 MG/1; MG/1
1-2 TABLET ORAL EVERY 4 HOURS PRN
Qty: 20 TABLET | Refills: 0 | Status: SHIPPED | OUTPATIENT
Start: 2024-04-18 | End: 2024-04-25

## 2024-04-18 RX ORDER — LIDOCAINE HYDROCHLORIDE 10 MG/ML
INJECTION, SOLUTION EPIDURAL; INFILTRATION; INTRACAUDAL; PERINEURAL AS NEEDED
Status: DISCONTINUED | OUTPATIENT
Start: 2024-04-18 | End: 2024-04-18 | Stop reason: SURG

## 2024-04-18 RX ORDER — PROCHLORPERAZINE EDISYLATE 5 MG/ML
5 INJECTION INTRAMUSCULAR; INTRAVENOUS EVERY 8 HOURS PRN
Status: DISCONTINUED | OUTPATIENT
Start: 2024-04-18 | End: 2024-04-18

## 2024-04-18 RX ORDER — KETOROLAC TROMETHAMINE 30 MG/ML
INJECTION, SOLUTION INTRAMUSCULAR; INTRAVENOUS AS NEEDED
Status: DISCONTINUED | OUTPATIENT
Start: 2024-04-18 | End: 2024-04-18 | Stop reason: SURG

## 2024-04-18 RX ORDER — LEVOFLOXACIN 5 MG/ML
500 INJECTION, SOLUTION INTRAVENOUS ONCE
Status: COMPLETED | OUTPATIENT
Start: 2024-04-18 | End: 2024-04-18

## 2024-04-18 RX ORDER — HYDROCODONE BITARTRATE AND ACETAMINOPHEN 5; 325 MG/1; MG/1
1 TABLET ORAL ONCE AS NEEDED
Status: COMPLETED | OUTPATIENT
Start: 2024-04-18 | End: 2024-04-18

## 2024-04-18 RX ORDER — KETOROLAC TROMETHAMINE 10 MG/1
10 TABLET, FILM COATED ORAL EVERY 8 HOURS PRN
Qty: 20 TABLET | Refills: 0 | Status: SHIPPED | OUTPATIENT
Start: 2024-04-18

## 2024-04-18 RX ORDER — HYDROCODONE BITARTRATE AND ACETAMINOPHEN 5; 325 MG/1; MG/1
2 TABLET ORAL ONCE AS NEEDED
Status: COMPLETED | OUTPATIENT
Start: 2024-04-18 | End: 2024-04-18

## 2024-04-18 RX ORDER — ONDANSETRON 2 MG/ML
INJECTION INTRAMUSCULAR; INTRAVENOUS AS NEEDED
Status: DISCONTINUED | OUTPATIENT
Start: 2024-04-18 | End: 2024-04-18 | Stop reason: SURG

## 2024-04-18 RX ORDER — ACETAMINOPHEN 500 MG
1000 TABLET ORAL ONCE
Status: DISCONTINUED | OUTPATIENT
Start: 2024-04-18 | End: 2024-04-18 | Stop reason: HOSPADM

## 2024-04-18 RX ADMIN — DEXAMETHASONE SODIUM PHOSPHATE 4 MG: 4 MG/ML VIAL (ML) INJECTION at 10:59:00

## 2024-04-18 RX ADMIN — LIDOCAINE HYDROCHLORIDE 50 MG: 10 INJECTION, SOLUTION EPIDURAL; INFILTRATION; INTRACAUDAL; PERINEURAL at 10:59:00

## 2024-04-18 RX ADMIN — SODIUM CHLORIDE, SODIUM LACTATE, POTASSIUM CHLORIDE, CALCIUM CHLORIDE: 600; 310; 30; 20 INJECTION, SOLUTION INTRAVENOUS at 10:59:00

## 2024-04-18 RX ADMIN — MIDAZOLAM HYDROCHLORIDE 2 MG: 1 INJECTION INTRAMUSCULAR; INTRAVENOUS at 10:59:00

## 2024-04-18 RX ADMIN — LEVOFLOXACIN 500 MG: 5 INJECTION, SOLUTION INTRAVENOUS at 11:06:00

## 2024-04-18 RX ADMIN — KETOROLAC TROMETHAMINE 30 MG: 30 INJECTION, SOLUTION INTRAMUSCULAR; INTRAVENOUS at 11:40:00

## 2024-04-18 RX ADMIN — ONDANSETRON 4 MG: 2 INJECTION INTRAMUSCULAR; INTRAVENOUS at 11:09:00

## 2024-04-18 NOTE — ANESTHESIA PROCEDURE NOTES
Airway  Date/Time: 4/18/2024 11:01 AM  Urgency: elective    Airway not difficult    General Information and Staff    Patient location during procedure: OR  Anesthesiologist: Franko Casillas MD  Resident/CRNA: Nory Rincon CRNA  Performed: CRNA   Performed by: Nory Rincon CRNA  Authorized by: Franko Casillas MD      Indications and Patient Condition  Indications for airway management: anesthesia  Spontaneous Ventilation: absent  Sedation level: deep  Preoxygenated: yes  Patient position: sniffing  Mask difficulty assessment: 1 - vent by mask    Final Airway Details  Final airway type: supraglottic airway      Successful airway: classic  Size 4       Number of attempts at approach: 1

## 2024-04-18 NOTE — ANESTHESIA POSTPROCEDURE EVALUATION
[facility]    Bartolo Veliz Patient Status:  Hospital Outpatient Surgery   Age/Gender 23 year old male MRN [id number]   Location [facility] POST ANESTHESIA CARE UNIT Attending Bret Carlson MD   Hosp Day # 0 PCP Keira Field       Anesthesia Post-op Note    CYSTOSCOPY, RIGHT RETROGRADE PYELOGRAM, RIGHT URETEROSCOPY, LASER LITHOTRIPSY, STONE EXTRACTION, RIGHT URETERAL STENT PLACEMENT    Procedure Summary       Date: 04/18/24 Room / Location:  MAIN OR 07 /  MAIN OR    Anesthesia Start: 1059 Anesthesia Stop: 1153    Procedure: CYSTOSCOPY, RIGHT RETROGRADE PYELOGRAM, RIGHT URETEROSCOPY, LASER LITHOTRIPSY, STONE EXTRACTION, RIGHT URETERAL STENT PLACEMENT (Right: Ureter) Diagnosis:       Nephrolithiasis      Ureteral stone      (Nephrolithiasis [N20.0]Ureteral stone [N20.1])    Surgeons: Bret Carlson MD Anesthesiologist: Franko Casillas MD    Anesthesia Type: general ASA Status: 1            Anesthesia Type: general    Vitals Value Taken Time   /55 04/18/24 1155   Temp 98 °F (36.7 °C) 04/18/24 1155   Pulse 56 04/18/24 1155   Resp 16 04/18/24 1155   SpO2 95 % 04/18/24 1155   Vitals shown include unfiled device data.    Patient Location: PACU    Anesthesia Type: general    Airway Patency: patent    Postop Pain Control: adequate    Mental Status: mildly sedated but able to meaningfully participate in the post-anesthesia evaluation    Nausea/Vomiting: none    Cardiopulmonary/Hydration status: stable euvolemic    Complications: no apparent anesthesia related complications    Postop vital signs: stable    Comments: Pt breathing comfortably with size 10 OPA in place. VSS. Report to Rn.     Dental Exam: Unchanged from Preop    Patient to be discharged from PACU when criteria met.

## 2024-04-18 NOTE — OPERATIVE REPORT
Urology Operative Note    Attending Surgeon: Bret Carlson MD    Patient Name: Bartolo Veliz    Date of Surgery: 4/18/2024    Preoperative Diagnosis: right sided kidney and ureteral stones    Postoperative Diagnosis: same    Procedure Performed: Cystoscopy, right flexible ureteroscopy with Holmium laser lithotripsy and basket extraction of stone fragments, right ureteral stent insertion    Indication for procedure:  Patient is a 23 year old male who presented with a right sided kidney and ureteral stones. The patient was counseled on options and elected to undergo the aforementioned procedure. We discussed the risks and benefits to surgery. We discussed risks including, but not limited to, bleeding, infection, possible damage to surrounding structures, possible need for repeat procedure(s). The patient understood these risks and wished to proceed with surgery.    Description of the procedure:  The patient was taken to the operating room and prepped and draped in lithotomy position after undergoing general anesthesia. The cystoscope was inserted and the bladder was inspected and drained. The right ureteral stent was grasped and this was pulled to the meatus. This was cannulated with a wire and the wire was passed up into the renal pelvis which I confirmed using fluoroscopy. The old stent was removed. I then inserted a flexible sheath and a second wire as a safety wire. I then reinserted the flexible sheath. We then proceeded with flexible ureteroscopy.  I was easily able to identify the stone burden with the largest stone measuring ~ 6-7 mm. We were able to fragment the stone easily using the Holmium laser and the stone fragments were removed.  I carefully inspected all the renal calices and the ureter in its entirety. No significant residual stone burden was remaining. I then removed the flexible scope and sheath. I inserted a 6 x 30 cm JJ ureteral stent over the safety wire.   I confirmed there was good curl  of stent in the kidney using fluoroscopy as well as good curl of stent in the bladder using direct cystoscopic examination.   The bladder was drained and the scope was removed.  The stone fragments were sent for analysis.   The patient was awoken having tolerated the procedure well.    Specimens: Stone fragments    Complications: No known complications.    Condition on Discharge from the operating room was stable    Plan: The patient will follow up in the office for stent removal.    Bret Carlson MD    Date: 4/18/2024 Time: 12:02 PM

## 2024-04-18 NOTE — ANESTHESIA PREPROCEDURE EVALUATION
PRE-OP EVALUATION    Patient Name: Bartolo Veliz    Admit Diagnosis: Nephrolithiasis [N20.0]  Ureteral stone [N20.1]    Pre-op Diagnosis: Nephrolithiasis [N20.0]  Ureteral stone [N20.1]    CYSTOSCOPY, RIGHT RETROGRADE PYELOGRAM, RIGHT URETEROSCOPY, LASER LITHOTRIPSY, STONE EXTRACTION, RIGHT URETERAL STENT PLACEMENT    Anesthesia Procedure: CYSTOSCOPY, RIGHT RETROGRADE PYELOGRAM, RIGHT URETEROSCOPY, LASER LITHOTRIPSY, STONE EXTRACTION, RIGHT URETERAL STENT PLACEMENT (Right)    Surgeons and Role:     * Bret Carlson MD - Primary    Pre-op vitals reviewed.  Temp: 97.3 °F (36.3 °C)  Pulse: 55  Resp: 16  BP: 136/84  SpO2: 98 %  Body mass index is 29.31 kg/m².    Current medications reviewed.  Hospital Medications:  • acetaminophen (Tylenol Extra Strength) tab 1,000 mg  1,000 mg Oral Once   • scopolamine (Transderm-Scop) 1 MG/3DAYS patch 1 patch  1 patch Transdermal Once   • lactated ringers infusion   Intravenous Continuous   • levoFLOXacin in dextrose 5% (Levaquin) 500 mg/100mL IVPB premix 500 mg  500 mg Intravenous Once       Outpatient Medications:     Medications Prior to Admission   Medication Sig Dispense Refill Last Dose   • sulfamethoxazole-trimethoprim DS (BACTRIM DS) 800-160 MG Oral Tab per tablet Take 1 tablet by mouth 2 (two) times daily for 3 days. 6 tablet 0 2024 at 0645   • phenazopyridine (PYRIDIUM) 100 MG Oral Tab Take 1 tablet (100 mg total) by mouth 3 (three) times daily as needed for Pain. This will turn your urine orange. 10 tablet 0 2024 at 0645   • oxybutynin 5 MG Oral Tab Take 1 tablet (5 mg total) by mouth 3 (three) times daily as needed (Bladder spasms). Stop 12 hours before Jarquin catheter removal in clinic (if applicable) 15 tablet 0 2024 at 0645   • [] HYDROcodone-acetaminophen 5-325 MG Oral Tab Take 1-2 tablets by mouth every 4 (four) hours as needed for Pain. 20 tablet 0 2024   • ondansetron 4 MG Oral Tablet Dispersible Take 1 tablet (4 mg total) by mouth  every 4 (four) hours as needed for Nausea. 10 tablet 0 4/18/2024 at 0645   • tamsulosin (FLOMAX) 0.4 MG Oral Cap Take 1 capsule (0.4 mg total) by mouth daily for 7 days. 7 capsule 0 4/18/2024 at 0645   • polyethylene glycol, PEG 3350, 17 g Oral Powd Pack Take 17 g by mouth daily as needed. 12 each 0 4/17/2024   • Ketorolac Tromethamine 10 MG Oral Tab Take 1 tablet (10 mg total) by mouth every 6 (six) hours as needed for Pain. 12 tablet 0 4/17/2024 at 1900       Allergies: Erythromycin      Anesthesia Evaluation    Patient summary reviewed.    Anesthetic Complications           GI/Hepatic/Renal    Negative GI/hepatic/renal ROS.                             Cardiovascular    Negative cardiovascular ROS.    Exercise tolerance: good     MET: >4                                           Endo/Other    Negative endo/other ROS.                              Pulmonary      (+) asthma                     Neuro/Psych    Negative neuro/psych ROS.                                Procedure Laterality Date   • Lasik       Social History     Socioeconomic History   • Marital status: Single   Tobacco Use   • Smoking status: Never     Passive exposure: Never   • Smokeless tobacco: Never   Vaping Use   • Vaping status: Never Used   Substance and Sexual Activity   • Alcohol use: Yes     Comment: SOCIALLY   • Drug use: Never     History   Drug Use Unknown     Available pre-op labs reviewed.  Lab Results   Component Value Date    WBC 10.3 04/15/2024    RBC 4.80 04/15/2024    HGB 14.4 04/15/2024    HCT 41.3 04/15/2024    MCV 86.0 04/15/2024    MCH 30.0 04/15/2024    MCHC 34.9 04/15/2024    RDW 11.9 04/15/2024    .0 04/15/2024     Lab Results   Component Value Date     04/15/2024    K 4.3 04/15/2024     04/15/2024    CO2 27.0 04/15/2024    BUN 13 04/15/2024    CREATSERUM 1.03 04/15/2024     (H) 04/15/2024    CA 9.7 04/15/2024            Airway      Mallampati: II  Mouth opening: >3 FB  TM distance: > 6 cm  Neck ROM:  full Cardiovascular      Rhythm: regular  Rate: normal     Dental    Dentition appears grossly intact         Pulmonary    Pulmonary exam normal.  Breath sounds clear to auscultation bilaterally.               Other findings        ASA: 1   Plan: general  NPO status verified and patient meets guidelines.        Comment: GA discussed.  Risk of PONV, cough, sore throat explained.  All questions answered.    Plan/risks discussed with: patient            Present on Admission:  **None**

## 2024-04-18 NOTE — TELEPHONE ENCOUNTER
Please call patient and verify he is OK coming next Tuesday at 10 am for cysto, stent removal. I added him in to the open spot.    Thanks,  MPH

## 2024-04-18 NOTE — DISCHARGE INSTRUCTIONS
You had a procedure called a CYSTOSCOPY today    - You may resume regular activity tomorrow.    - You may have a post-operative appointment that is already scheduled for you. If the appointment date or time does not work with your schedule please call our office to reschedule ([phone]). Alternatively our office may be reaching out to you to schedule the appointment.     - You may experience pain after the procedure for 1-2 days.  If pain becomes intolerable please contact our office or go to the nearest Emergency Room/Immediate Care. You make take over-the counter ibuprofen for mild pain (provided you do not have a medical condition such as stomach ulcers or kidney disease which prohibits you from taking). You may take this in addition to tylenol or narcotic pain medication. Hot packs may help for discomfort as well.    - If you are medically allowed to take ibuprofen then you may take 200-400 mg every 8 hours as needed for pain with plenty of fluids. You may take this in addition to tylenol or other pain medication which may be prescribed.     - You may experience burning with urination and frequency of urination over the next few days.     - You may see blood in your urine that should clear up within a few days. If you have a stent in place then you may see blood in the urine until the stent is eventually removed.     - Try to abstain from alcohol, coffee, tea, artificial sweeteners, and spicy food for the next 48 hours as these can irritate the bladder.     - If you develop a fever, chills, difficulty urinating or abdominal pain in the next 24 hours, call the office.     - Drink 1.5 to 2 liters of fluid today, water is preferable. If you are on a fluid restriction due to other medical reasons then you need to adhere to your fluid restriction recommendations.

## 2024-04-18 NOTE — PROGRESS NOTES
HPI:     Bartolo Veliz is a 23 year old male with a PMH of ADD, asthma.    Following for:  1. Recurrent kidney stones  -  s/p right URS 4/18/24 for ureteral and renal stones  - no prior stone episodes  2. Fam h/o CaP  - MGF dx in 60s    PCP - ELLA Field    Presents for check-up, stent removal, discuss stone prevention.    He feels well. Appetite and energy are good.    He tolerated the stent    UA is negative for infection    UTI hx: none  Tobacco hx: none    Was drinking 16 oz water in the past but now up to 60 oz water per day with clear yellow urine. I encouraged the pt drink at least 40-60 oz water per day or enough to keep urine clear to pale yellow for stone prevention.    CT SP 4/3/24: 6 distal right ureteral. 1, 1 RUP, 1 RMP, 1, 1 RLP. 3 LMP    We discussed obtaining a 24 h urine for stone prevention and the patient would like to do this.  Will plan for phone visit once 24 h urine results are back    PROCEDURE NOTE    PREOPERATIVE DIAGNOSIS:     Retained ureteral stent     POSTOP DIAGNOSIS:    same    PROCEDURE PERFORMED: Flexible Cystoscopy with Ureteral Stent Removal    After informed consent and urinalysis was obtained, he was placed in the supine position and prepped and draped in the usual sterile fashion using Betadine. Local anesthesia was induced by the introduction of 2% Lidocaine jelly per urethra.  A 16 [place] flexible scope was passed through the urethra, and the bladder was entered, the stent was identified and grasped with a grasper and gently removed.  The patient tolerated the procedure well, suffered no complications, was able to void spontaneously after completion of the procedure in the office, and left the office in good condition. The patient was given periprocedural antibiotics.  ______________________________________      HISTORY:  Past Medical History:    ADD (attention deficit disorder)    NO MEDICATION    Asthma (HCC)    SPORTS INDUCED    Attention deficit hyperactivity  disorder (ADHD)    NO MEDICATION    Calculus of kidney    Hx of motion sickness    Kawasaki disease (HCC)    unspecified    Kidney stones      Past Surgical History:   Procedure Laterality Date    [place]aria        Family History   Problem Relation Age of Onset    Cancer Maternal Grandfather         prostate    Cancer Other     Breast Cancer Mother 49    Breast Cancer Maternal Grandmother 72    Heart Disease Neg     Stroke Neg       Social History:   Social History     Socioeconomic History    Marital status: Single   Tobacco Use    Smoking status: Never     Passive exposure: Never    Smokeless tobacco: Never   Vaping Use    Vaping status: Never Used   Substance and Sexual Activity    Alcohol use: Yes     Comment: SOCIALLY    Drug use: Never     Social Determinants of Health     Food Insecurity: No Food Insecurity (4/14/2024)    Food Insecurity     Food Insecurity: Never true   Transportation Needs: No Transportation Needs (4/14/2024)    Transportation Needs     Lack of Transportation: No   Housing Stability: Low Risk  (4/14/2024)    Housing Stability     Housing Instability: No        Medications (Active prior to today's visit):  Current Outpatient Medications   Medication Sig Dispense Refill    phenazopyridine (PYRIDIUM) 100 MG Oral Tab Take 1 tablet (100 mg total) by mouth 3 (three) times daily as needed for Pain. This will turn your urine orange. 15 tablet 0    tamsulosin (FLOMAX) 0.4 MG Oral Cap Take 1 capsule (0.4 mg total) by mouth daily for 10 days. 10 capsule 0    oxybutynin 5 MG Oral Tab Take 1 tablet (5 mg total) by mouth 3 (three) times daily as needed (Bladder spasms). Stop 12 hours before Jarquin catheter removal in clinic (if applicable) 15 tablet 0    HYDROcodone-acetaminophen 5-325 MG Oral Tab Take 1-2 tablets by mouth every 4 (four) hours as needed for Pain. (Patient not taking: Reported on 4/23/2024) 20 tablet 0    Ketorolac Tromethamine 10 MG Oral Tab Take 1 tablet (10 mg total) by mouth every 8 (eight)  hours as needed for Pain (Use sparingly and with plenty of water). (Patient not taking: Reported on 4/23/2024) 20 tablet 0       Allergies:  Allergies   Allergen Reactions    Erythromycin SWELLING     EXACERBATED KAWASAKI-SWELLING, RASH, FEVER         ROS:     A comprehensive 10 point review of systems was completed.  Pertinent positives and negatives noted in the the HPI.    PHYSICAL EXAM:     GENERAL APPEARANCE: well, developed, well nourished, in no acute distress  NEUROLOGIC: nonfocal, alert and oriented  HEAD: normocephalic, atraumatic  EYES: sclera non-icteric  EARS: hearing intact  ORAL CAVITY: mucosa moist  NECK/THYROID: no obvious goiter or masses  LUNGS: nonlabored breathing  ABDOMEN: soft, no obvious masses or tenderness  SKIN: no obvious rashes    : as noted above    ASSESSMENT/PLAN:   Diagnoses and all orders for this visit:    Urine finding  -     ciprofloxacin (Cipro) tab 500 mg  -     POC Urinalysis, Automated Dip without microscopy (PCA and EMMG ONLY) [19688]    Recurrent kidney stones  -     Kidney Stone Urine Test Combination With Saturation Calculations; Future    Foreign body in bladder, initial encounter  -     CYSTOSCOPY,REMV CALCULUS,SIMPLE      - as noted above.    Thanks again for this consult.    Bret Carlson MD, FACS  Urologist  [facility]  Office: [phone]

## 2024-04-18 NOTE — INTERVAL H&P NOTE
Pre-op Diagnosis: Nephrolithiasis [N20.0]  Ureteral stone [N20.1]    The above referenced H&P was reviewed by Bret Carlson MD on 4/18/2024, the patient was examined and no significant changes have occurred in the patient's condition since the H&P was performed.  I discussed with the patient and/or legal representative the potential benefits, risks and side effects of this procedure; the likelihood of the patient achieving goals; and potential problems that might occur during recuperation.  I discussed reasonable alternatives to the procedure, including risks, benefits and side effects related to the alternatives and risks related to not receiving this procedure.  We will proceed with procedure as planned.

## 2024-04-23 ENCOUNTER — PROCEDURE (OUTPATIENT)
Dept: SURGERY | Facility: CLINIC | Age: 24
End: 2024-04-23
Payer: COMMERCIAL

## 2024-04-23 VITALS — DIASTOLIC BLOOD PRESSURE: 76 MMHG | SYSTOLIC BLOOD PRESSURE: 118 MMHG

## 2024-04-23 DIAGNOSIS — R82.90 URINE FINDING: Primary | ICD-10-CM

## 2024-04-23 DIAGNOSIS — N20.0 RECURRENT KIDNEY STONES: ICD-10-CM

## 2024-04-23 DIAGNOSIS — T19.1XXA FOREIGN BODY IN BLADDER, INITIAL ENCOUNTER: ICD-10-CM

## 2024-04-23 LAB
APPEARANCE: CLEAR
BILIRUBIN: NEGATIVE
GLUCOSE (URINE DIPSTICK): NEGATIVE MG/DL
KETONES (URINE DIPSTICK): NEGATIVE MG/DL
MULTISTIX LOT#: ABNORMAL NUMERIC
NITRITE, URINE: NEGATIVE
PH, URINE: 5.5 (ref 4.5–8)
PROTEIN (URINE DIPSTICK): 30 MG/DL
SPECIFIC GRAVITY: 1.02 (ref 1–1.03)
URINE-COLOR: YELLOW
UROBILINOGEN,SEMI-QN: 0.2 MG/DL (ref 0–1.9)

## 2024-04-23 PROCEDURE — 81003 URINALYSIS AUTO W/O SCOPE: CPT | Performed by: UROLOGY

## 2024-04-23 PROCEDURE — 99213 OFFICE O/P EST LOW 20 MIN: CPT | Performed by: UROLOGY

## 2024-04-23 PROCEDURE — 3078F DIAST BP <80 MM HG: CPT | Performed by: UROLOGY

## 2024-04-23 PROCEDURE — 3074F SYST BP LT 130 MM HG: CPT | Performed by: UROLOGY

## 2024-04-23 PROCEDURE — 52310 CYSTOSCOPY AND TREATMENT: CPT | Performed by: UROLOGY

## 2024-04-23 RX ORDER — CIPROFLOXACIN 500 MG/1
500 TABLET, FILM COATED ORAL ONCE
Status: COMPLETED | OUTPATIENT
Start: 2024-04-23 | End: 2024-04-23

## 2024-04-23 RX ADMIN — CIPROFLOXACIN 500 MG: 500 TABLET, FILM COATED ORAL at 11:12:00

## 2024-04-25 LAB
CAOX DIHYDRATE: 30 %
CAOX MONOHYDRATE: 70 %
WEIGHT-STONE: 89 MG

## 2024-04-28 ENCOUNTER — PATIENT MESSAGE (OUTPATIENT)
Dept: FAMILY MEDICINE CLINIC | Facility: CLINIC | Age: 24
End: 2024-04-28

## 2024-04-29 NOTE — TELEPHONE ENCOUNTER
From: Bartolo Veliz  To: Keira Field  Sent: 4/28/2024 6:58 AM CDT  Subject: Cold sore medicine    Can you please call and approve a prescription for Valtrex please for a refill. I’m going to [place] for 2 weeks, I wanted to bring some with me for the trip and some when i get back because I’ll be in the sun a lot which I think that’s a trigger which I want to take no chances especially taking engagement photos while there!     I can grab it first thing this morning     Thank you!!!!!

## 2024-04-30 RX ORDER — VALACYCLOVIR HYDROCHLORIDE 1 G/1
TABLET, FILM COATED ORAL
Qty: 20 TABLET | Refills: 0 | Status: SHIPPED | OUTPATIENT
Start: 2024-04-30

## 2024-05-28 RX ORDER — VALACYCLOVIR HYDROCHLORIDE 1 G/1
TABLET, FILM COATED ORAL
Qty: 20 TABLET | Refills: 0 | Status: SHIPPED | OUTPATIENT
Start: 2024-05-28

## 2024-06-07 PROCEDURE — 82507 ASSAY OF CITRATE: CPT

## 2024-06-07 PROCEDURE — 83986 ASSAY PH BODY FLUID NOS: CPT

## 2024-06-07 PROCEDURE — 83735 ASSAY OF MAGNESIUM: CPT

## 2024-06-07 PROCEDURE — 84560 ASSAY OF URINE/URIC ACID: CPT

## 2024-06-07 PROCEDURE — 84133 ASSAY OF URINE POTASSIUM: CPT

## 2024-06-07 PROCEDURE — 84105 ASSAY OF URINE PHOSPHORUS: CPT

## 2024-06-07 PROCEDURE — 84300 ASSAY OF URINE SODIUM: CPT

## 2024-06-07 PROCEDURE — 84392 ASSAY OF URINE SULFATE: CPT

## 2024-06-07 PROCEDURE — 82340 ASSAY OF CALCIUM IN URINE: CPT

## 2024-06-07 PROCEDURE — 83945 ASSAY OF OXALATE: CPT

## 2024-06-07 PROCEDURE — 82436 ASSAY OF URINE CHLORIDE: CPT

## 2024-06-08 ENCOUNTER — LAB ENCOUNTER (OUTPATIENT)
Dept: LAB | Age: 24
End: 2024-06-08
Attending: UROLOGY
Payer: COMMERCIAL

## 2024-06-08 DIAGNOSIS — N20.0 RECURRENT KIDNEY STONES: ICD-10-CM

## 2024-07-19 RX ORDER — VALACYCLOVIR HYDROCHLORIDE 1 G/1
TABLET, FILM COATED ORAL
Qty: 30 TABLET | Refills: 0 | Status: SHIPPED | OUTPATIENT
Start: 2024-07-19

## 2024-07-19 NOTE — TELEPHONE ENCOUNTER
A refill request was received for:  Requested Prescriptions     Pending Prescriptions Disp Refills    valACYclovir (VALTREX) 1 G Oral Tab 30 tablet 0     Si tabs po Q 12 hrs prn cold sore       Pt requests 30 day supply because of frequency of outbreak.    Last refill date:   2024    Last office visit:  2023    Follow up due:  No future appointments.

## 2025-01-02 RX ORDER — VALACYCLOVIR HYDROCHLORIDE 1 G/1
TABLET, FILM COATED ORAL
Qty: 30 TABLET | Refills: 0 | OUTPATIENT
Start: 2025-01-02

## 2025-03-25 ENCOUNTER — OFFICE VISIT (OUTPATIENT)
Dept: FAMILY MEDICINE CLINIC | Facility: CLINIC | Age: 25
End: 2025-03-25
Payer: COMMERCIAL

## 2025-03-25 VITALS
WEIGHT: 254 LBS | DIASTOLIC BLOOD PRESSURE: 60 MMHG | HEIGHT: 78 IN | SYSTOLIC BLOOD PRESSURE: 110 MMHG | OXYGEN SATURATION: 98 % | HEART RATE: 61 BPM | RESPIRATION RATE: 16 BRPM | BODY MASS INDEX: 29.39 KG/M2

## 2025-03-25 DIAGNOSIS — Z00.00 ROUTINE GENERAL MEDICAL EXAMINATION AT HEALTH CARE FACILITY: Primary | ICD-10-CM

## 2025-03-25 DIAGNOSIS — B00.1 RECURRENT COLD SORES: ICD-10-CM

## 2025-03-25 DIAGNOSIS — E55.9 VITAMIN D DEFICIENCY: ICD-10-CM

## 2025-03-25 DIAGNOSIS — R53.83 FATIGUE, UNSPECIFIED TYPE: ICD-10-CM

## 2025-03-25 DIAGNOSIS — L70.9 ACNE, UNSPECIFIED ACNE TYPE: ICD-10-CM

## 2025-03-25 DIAGNOSIS — N52.9 ERECTILE DYSFUNCTION, UNSPECIFIED ERECTILE DYSFUNCTION TYPE: ICD-10-CM

## 2025-03-25 PROCEDURE — 99395 PREV VISIT EST AGE 18-39: CPT | Performed by: FAMILY MEDICINE

## 2025-03-25 RX ORDER — CLINDAMYCIN PHOSPHATE 10 MG/G
1 GEL TOPICAL NIGHTLY
Qty: 45 G | Refills: 3 | Status: SHIPPED | OUTPATIENT
Start: 2025-03-25 | End: 2026-03-20

## 2025-03-25 RX ORDER — VALACYCLOVIR HYDROCHLORIDE 500 MG/1
500 TABLET, FILM COATED ORAL DAILY
Qty: 90 TABLET | Refills: 0 | Status: SHIPPED | OUTPATIENT
Start: 2025-03-25

## 2025-03-25 RX ORDER — VALACYCLOVIR HYDROCHLORIDE 500 MG/1
500 TABLET, FILM COATED ORAL DAILY
Qty: 90 TABLET | Refills: 0 | Status: SHIPPED | OUTPATIENT
Start: 2025-03-25 | End: 2025-03-25

## 2025-03-25 RX ORDER — CLINDAMYCIN PHOSPHATE 10 MG/G
1 GEL TOPICAL NIGHTLY
Qty: 45 G | Refills: 3 | Status: SHIPPED | OUTPATIENT
Start: 2025-03-25 | End: 2025-03-25

## 2025-03-25 NOTE — PROGRESS NOTES
Subjective:   Patient ID: Bartolo Veliz is a 24 year old male.    HPI    History/Other:   Review of Systems  Current Outpatient Medications   Medication Sig Dispense Refill    valACYclovir (VALTREX) 1 G Oral Tab 2 tabs po Q 12 hrs prn cold sore (Patient not taking: Reported on 3/25/2025) 30 tablet 0     Allergies:Allergies[1]    Objective:   Physical Exam    Assessment & Plan:   1. Routine general medical examination at health care facility    2. Vitamin D deficiency        Orders Placed This Encounter   Procedures    CBC With Differential With Platelet    Comp Metabolic Panel (14)    Lipid Panel    TSH W Reflex To Free T4    VITAMIN D, 25-HYDROXY [26952][Q]       Meds This Visit:  Requested Prescriptions      No prescriptions requested or ordered in this encounter       Imaging & Referrals:  None         [1]   Allergies  Allergen Reactions    Erythromycin SWELLING     EXACERBATED KAWASAKI-SWELLING, RASH, FEVER

## 2025-03-25 NOTE — H&P
The 21st Century Cures Act makes medical notes like these available to patients in the interest of transparency. Please be advised this is a medical document. Medical documents are intended to carry relevant information, facts as evident, and the clinical opinion of the practitioner. The medical note is intended as peer to peer communication and may appear blunt or direct. It is written in medical language and may contain abbreviations or verbiage that are unfamiliar.       Bartolo Veliz is a 24 year old male who presents for a complete physical exam.   HPI:   Pt needs refill his medicines.  He is having acnes on his neck.   Early ejaculation.  Complain of hair loss.       Current Outpatient Medications   Medication Sig Dispense Refill    valACYclovir (VALTREX) 1 G Oral Tab 2 tabs po Q 12 hrs prn cold sore (Patient not taking: Reported on 3/25/2025) 30 tablet 0      Past Medical History:    ADD (attention deficit disorder)    NO MEDICATION    Asthma (HCC)    SPORTS INDUCED    Attention deficit hyperactivity disorder (ADHD)    NO MEDICATION    Calculus of kidney    Hx of motion sickness    Kawasaki disease (HCC)    unspecified    Kidney stones      Past Surgical History:   Procedure Laterality Date    Candelaria        Family History   Problem Relation Age of Onset    Cancer Maternal Grandfather         prostate    Cancer Other     Breast Cancer Mother 49    Breast Cancer Maternal Grandmother 72    Heart Disease Neg     Stroke Neg       Social History:  Social History     Socioeconomic History    Marital status: Single   Tobacco Use    Smoking status: Never     Passive exposure: Never    Smokeless tobacco: Never   Vaping Use    Vaping status: Never Used   Substance and Sexual Activity    Alcohol use: Yes     Comment: SOCIALLY    Drug use: Never     Social Drivers of Health     Food Insecurity: No Food Insecurity (4/14/2024)    Food Insecurity     Food Insecurity: Never true   Transportation Needs: No Transportation  Needs (4/14/2024)    Transportation Needs     Lack of Transportation: No   Housing Stability: Low Risk  (4/14/2024)    Housing Stability     Housing Instability: No         REVIEW OF SYSTEMS:   GENERAL: feels well otherwise  SKIN: denies any unusual skin lesions  EYES:denies blurred vision or double vision  HEENT: denies nasal congestion, sinus pain or ST, denies changes in hearing or vision  LUNGS: denies shortness of breath with exertion or frequent cough  CV: denies chest pain, pressure or palpitations  GI: denies abdominal pain,denies heartburn, denies chronic diarrhea or constipation  : denies nocturia or changes in stream, denies erectile dysfunction  MS: denies back pain, arthralgias or myalgias  NEURO: denies headaches or dizziness  PSYCH: denies depression or anxiety  HEMATOLOGIC: denies hx of anemia, easy bruising or bleeding  ENDOCRINE:denies frequent thirst or urination, denies unintentional weight gain/loss  ALL/ASTHMA: denies hx of allergy or asthma    EXAM:   /60   Pulse 61   Resp 16   Ht 6' 7\" (2.007 m)   Wt 254 lb   SpO2 98%   BMI 28.61 kg/m²   Body mass index is 28.61 kg/m².     GENERAL: well developed, well nourished,in no apparent distress  SKIN: no rashes,no suspicious lesions  HEENT: atraumatic, normocephalic, PERRLA, conjunctiva clear, TMs clear, posterior pharynx clear, no congestion  NECK: supple,no adenopathy,no thyromegaly  LUNGS: clear to auscultation, easy breathing  CV: S1S2, RRR without murmur  GI: good BS's;no masses, HSM or tenderness  : two descended testes,no scrotal tenderness or mass, normal penis no lesions, no hernia  MS: Judi, no bony deformities, gait normal  EXT: no cyanosis, clubbing or edema  NEURO: Oriented times three, strength 5/5 x 4 ext, LE DTRs 2+  PSYCH: mood and affect appropriate    ASSESSMENT AND PLAN:   Bartolo Veliz is a 24 year old male who presents for a complete physical exam. Recommended heart healthy diet, routine exercise, and annual  flu vaccines.  Routine testicular exams reinforced. The patient indicates understanding of these issues and agrees to the plan.  1. Routine general medical examination at health care facility    - CBC With Differential With Platelet  - Comp Metabolic Panel (14)  - Lipid Panel  - TSH W Reflex To Free T4  - VITAMIN D, 25-HYDROXY [35469][Q]    2. Vitamin D deficiency    - VITAMIN D, 25-HYDROXY [38394][Q]    3. Recurrent cold sores    - valACYclovir 500 MG Oral Tab; Take 1 tablet (500 mg total) by mouth daily.  Dispense: 90 tablet; Refill: 0    4. Fatigue, unspecified type      5. Erectile dysfunction, unspecified erectile dysfunction type  Work on diet, lifestyle, exercise.  Will see if less stress fixes this problem    6. Acne, unspecified acne type    - Clindamycin Phosphate 1 % External Gel; Apply 1 Application topically nightly.  Dispense: 45 g; Refill: 3    Follow up in 1 year.    Orders Placed This Encounter   Procedures    CBC With Differential With Platelet     Order Specific Question:   Release to patient     Answer:   Immediate    Comp Metabolic Panel (14)     Order Specific Question:   Release to patient     Answer:   Immediate    Lipid Panel     Order Specific Question:   Release to patient     Answer:   Immediate    TSH W Reflex To Free T4     Order Specific Question:   Release to patient     Answer:   Immediate    VITAMIN D, 25-HYDROXY [[id number]][Q]     Order Specific Question:   Release to patient     Answer:   Immediate

## 2025-07-02 ENCOUNTER — OFFICE VISIT (OUTPATIENT)
Dept: FAMILY MEDICINE CLINIC | Facility: CLINIC | Age: 25
End: 2025-07-02
Payer: COMMERCIAL

## 2025-07-02 VITALS
HEART RATE: 56 BPM | WEIGHT: 246 LBS | SYSTOLIC BLOOD PRESSURE: 122 MMHG | DIASTOLIC BLOOD PRESSURE: 82 MMHG | OXYGEN SATURATION: 98 % | HEIGHT: 78 IN | BODY MASS INDEX: 28.46 KG/M2 | RESPIRATION RATE: 16 BRPM

## 2025-07-02 DIAGNOSIS — Z00.00 LABORATORY EXAMINATION ORDERED AS PART OF A COMPLETE PHYSICAL EXAMINATION: ICD-10-CM

## 2025-07-02 DIAGNOSIS — R21 RASH OF GENITAL AREA: Primary | ICD-10-CM

## 2025-07-02 DIAGNOSIS — E55.9 VITAMIN D DEFICIENCY: ICD-10-CM

## 2025-07-02 PROCEDURE — 99213 OFFICE O/P EST LOW 20 MIN: CPT | Performed by: FAMILY MEDICINE

## 2025-07-02 RX ORDER — KETOCONAZOLE 20 MG/G
1 CREAM TOPICAL DAILY
Qty: 1 EACH | Refills: 1 | Status: SHIPPED | OUTPATIENT
Start: 2025-07-02

## 2025-07-02 RX ORDER — TRIAMCINOLONE ACETONIDE 1 MG/G
1 CREAM TOPICAL 2 TIMES DAILY PRN
Qty: 1 EACH | Refills: 1 | Status: SHIPPED | OUTPATIENT
Start: 2025-07-02

## 2025-07-02 NOTE — PROGRESS NOTES
Subjective:   Patient ID: Bartolo Veliz is a 25 year old male.    Rashes in the groin area, it comes and goes.  Itchiness + .concern about yeast infection. Her partner is also having yeast infection from 2 weeks. Also has rashes the both armpits.      History/Other:   Review of Systems   All other systems reviewed and are negative.    Current Medications[1]  Allergies:Allergies[2]    Objective:   Physical Exam  Skin:     Comments: Faint redness in groin and tip of pinus.    Red rashes on B/L armpits.         Assessment & Plan:   1. Rash of genital area      1. Rash of genital area  - triamcinolone 0.1 % External Cream; Apply 1 Application topically 2 (two) times daily as needed. Up to 1 weeks, then 1 week break before restarting this cycle as needed.  Dispense: 1 each; Refill: 1  - ketoconazole 2 % External Cream; Apply 1 Application topically daily.  Dispense: 1 each; Refill: 1    Meds This Visit:  Requested Prescriptions     Signed Prescriptions Disp Refills    triamcinolone 0.1 % External Cream 1 each 1     Sig: Apply 1 Application topically 2 (two) times daily as needed. Up to 1 weeks, then 1 week break before restarting this cycle as needed.    ketoconazole 2 % External Cream 1 each 1     Sig: Apply 1 Application topically daily.       Imaging & Referrals:  None    Note written by suresh.  Suresh is in the room with me.  Suresh has written note according to my dictation.  Reviewed scribe note.  Changed as appropriate.         [1]   Current Outpatient Medications   Medication Sig Dispense Refill    triamcinolone 0.1 % External Cream Apply 1 Application topically 2 (two) times daily as needed. Up to 1 weeks, then 1 week break before restarting this cycle as needed. 1 each 1    ketoconazole 2 % External Cream Apply 1 Application topically daily. 1 each 1    valACYclovir 500 MG Oral Tab Take 1 tablet (500 mg total) by mouth daily. 90 tablet 0    Clindamycin Phosphate 1 % External Gel Apply 1 Application  topically nightly. 45 g 3   [2]   Allergies  Allergen Reactions    Erythromycin SWELLING     EXACERBATED KAWASAKI-SWELLING, RASH, FEVER

## 2025-08-21 DIAGNOSIS — R21 RASH OF GENITAL AREA: ICD-10-CM

## 2025-08-21 RX ORDER — TRIAMCINOLONE ACETONIDE 1 MG/G
1 CREAM TOPICAL 2 TIMES DAILY PRN
Qty: 1 EACH | Refills: 1 | OUTPATIENT
Start: 2025-08-21

## (undated) DEVICE — SOLUTION IRRIG 3000ML 0.9% NACL FLX CONT

## (undated) DEVICE — SYRINGE MED 20ML STD CLR PLAS LL TIP N CTRL

## (undated) DEVICE — CONTAINER,SPECIMEN,OR STERILE,4OZ: Brand: MEDLINE

## (undated) DEVICE — GUIDEWIRE ENDOSCP L150CM DIA0.035IN TIP 3CM

## (undated) DEVICE — SKN PREP SPNG STKS PVP PNT STR: Brand: MEDLINE INDUSTRIES, INC.

## (undated) DEVICE — SEAL BIOPSY PORT ACMI

## (undated) DEVICE — SYSTEM PMP SYR 10CC VAC SGL ACT 1 W VLV

## (undated) DEVICE — SPONGE GZ 4X4IN COT 12 PLY TYP VII WVN C

## (undated) DEVICE — SLEEVE COMPR MD KNEE LEN SGL USE KENDALL SCD

## (undated) DEVICE — FIBER LSR 200UM 2J 80HZ 60W DL FOR LITHO

## (undated) DEVICE — ADAPTER BX SEAL PRT ADJ FOR HYSTEROSCOPE

## (undated) DEVICE — URETERAL ACCESS SHEATH SET: Brand: NAVIGATOR HD

## (undated) DEVICE — NITINOL STONE RETRIEVAL BASKET: Brand: ZERO TIP

## (undated) DEVICE — CYSTO PACK: Brand: MEDLINE INDUSTRIES, INC.

## (undated) DEVICE — CATHETER URET 5FR L70CM FLX OPN TIP NONPORTED

## (undated) DEVICE — NITINOL WIRE WITH HYDROPHILIC TIP: Brand: SENSOR

## (undated) DEVICE — RETRIEVAL DEPLOYMENT DEVICE: Brand: LITHOVUE EMPOWER™

## (undated) DEVICE — ZIPWIRE GUIDEWIRE .038X150 STR

## (undated) DEVICE — 20 ML SYRINGE LUER-LOCK TIP: Brand: MONOJECT

## (undated) DEVICE — GAMMEX® PI HYBRID SIZE 7, STERILE POWDER-FREE SURGICAL GLOVE, POLYISOPRENE AND NEOPRENE BLEND: Brand: GAMMEX

## (undated) DEVICE — GLOVE,SURG,SENSICARE SLT,LF,PF,7.5: Brand: MEDLINE

## (undated) DEVICE — PACK PBDS CYSTOSCOPY

## (undated) NOTE — LETTER
Date & Time: 11/6/2022, 4:07 PM  Patient: Bita Ewing  Encounter Provider(s):    Molly Salomon PA-C       To Whom It May Concern:    Meryl Shah was seen and treated in our department on 11/6/2022. He should not return to work until fever free for 24 hours . If you have any questions or concerns, please do not hesitate to call.       Carlos Davenport PA-C    _____________________________  [id number]/KVW Signature

## (undated) NOTE — LETTER
20    Patient: Nasrin Vasquez  : 2000 Visit date: 2020    Dear  Dr. Dorcas Gallagher MD,    Thank you for referring Nasrin Vasquez to my practice. Please find my assessment and plan below.              Assessment   Gynecomastia  (primary encou in the 9 o'clock position of the areola. There are no skin changes, there is no axillary lymphadenopathy. Clinical examination of the left breast reveals there to be a symmetric breast bud behind the left nipple. There are no other palpable masses.   The making, and to a go over the results. All of the patient and his mother's questions were answered at this time. They verbalized understanding agreement the plan of care. I will next see the patient on June 29, 2020.           Sincerely,       Gilles Nuñez

## (undated) NOTE — LETTER
Date & Time: 4/4/2022, 5:23 PM  Patient: Kiley Bacon  Encounter Provider(s):    JONNIE Chavez       To Whom It May Concern:    Porsche Owusu was seen and treated in our department on 4/4/2022. He Should be excused from any work or classes today April 4, 2022 and tomorrow on April 5, 2022 if he is not feeling better. .    If you have any questions or concerns, please do not hesitate to call.        _____________________________  Physician/APC Signature

## (undated) NOTE — LETTER
[facility]  Administration of Anesthesia  ADALGISA Bartolo LYLY Jose Alfredo agree to be cared for by a physician anesthesiologist alone and/or with a nurse anesthetist, who is specially trained to monitor me and give me medicine to put me to sleep or keep me comfortable during my procedure    I understand that my anesthesiologist and/or anesthetist is not an employee or agent of [facility] or [employer]. He or she works for [facility]    As the patient asking for anesthesia services, I agree to:  Allow the anesthesiologist (anesthesia doctor) to give me medicine and do additional procedures as necessary. Some examples are: Starting or using an “IV” to give me medicine, fluids or blood during my procedure, and having a breathing tube placed to help me breathe when I’m asleep (intubation). In the event that my heart stops working properly, I understand that my anesthesiologist will make every effort to sustain my life, unless otherwise directed by [facility] Do Not Resuscitate documents.  Tell my anesthesia doctor before my procedure:  If I am pregnant.  The last time that I ate or drank.  iii. All of the medicines I take (including prescriptions, herbal supplements, and pills I can buy without a prescription (including street drugs/illegal medications). Failure to inform my anesthesiologist about these medicines may increase my risk of anesthetic complications.  iv.If I am allergic to anything or have had a reaction to anesthesia before.  I understand how the anesthesia medicine will help me (benefits).  I understand that with any type of anesthesia medicine there are risks:  The most common risks are: nausea, vomiting, sore throat, muscle soreness, damage to my eyes, mouth, or teeth (from breathing tube placement).  Rare risks include: remembering what happened during my procedure, allergic reactions to medications, injury to my airway, heart, lungs, vision, nerves, or  muscles and in extremely rare instances death.  My doctor has explained to me other choices available to me for my care (alternatives).  Pregnant Patients (“epidural”):  I understand that the risks of having an epidural (medicine given into my back to help control pain during labor), include itching, low blood pressure, difficulty urinating, headache or slowing of the baby’s heart. Very rare risks include infection, bleeding, seizure, irregular heart rhythms and nerve injury.  Regional Anesthesia (“spinal”, “epidural”, & “nerve blocks”):  I understand that rare but potential complications include headache, bleeding, infection, seizure, irregular heart rhythms, and nerve injury.    _____________________________________________________________________________  Patient (or Representative) Signature/Relationship to Patient  Date   Time    _____________________________________________________________________________   Name (if used)    Language/Organization   Time    _____________________________________________________________________________  Nurse Anesthetist Signature     Date   Time  _____________________________________________________________________________  Anesthesiologist Signature     Date   Time  I have discussed the procedure and information above with the patient (or patient’s representative) and answered their questions. The patient or their representative has agreed to have anesthesia services.    _____________________________________________________________________________  Witness        Date   Time  I have verified that the signature is that of the patient or patient’s representative, and that it was signed before the procedure  Patient Name: Bartolo Ornelaspec     : 2000                 Printed: 2024 at 5:57 PM    Medical Record #: [id number]                                            Page 1 of 1  ----------ANESTHESIA CONSENT----------

## (undated) NOTE — LETTER
Date & Time: 11/6/2022, 4:08 PM  Patient: Pattie Shay  Encounter Provider(s):    Jaky Hardin PA-C       To Whom It May Concern:    Aric Anaya was seen and treated in our department on 11/6/2022. He should not return to school until fever free for 24 hours . If you have any questions or concerns, please do not hesitate to call.       Alyson George PA-C,    _____________________________  JYYGHAJOHN/AVLYLY Signature

## (undated) NOTE — ED AVS SNAPSHOT
Rocio Smith   MRN: [id number]    Department:  [facility] Emergency Department   Date of Visit:  5/5/2018           Disclosure     Insurance plans vary and the physician(s) referred by the ER may not be covered by your plan.  Please contact your tell this physician (or your personal doctor if your instructions are to return to your personal doctor) about any new or lasting problems. The primary care or specialist physician will see patients referred from the [facility] Emergency Department.  Akshat Narayanan

## (undated) NOTE — LETTER
Date & Time: 4/4/2022, 5:29 PM  Patient: Zulema Estevez  Encounter Provider(s):    JONNIE Mercado       To Whom It May Concern:    Giacomo Mohamud was seen and treated in our department on 4/4/2022. He should not return to school until April 7, 2022.     If you have any questions or concerns, please do not hesitate to call.        _____________________________  Physician/APC Signature

## (undated) NOTE — LETTER
[facility]  [address], [place], [place]  Authorization for Surgical Operation and Procedure                                                                                           I hereby authorize Tavares Dacosta MD, my physician and his/her assistants (if applicable), which may include medical students, residents, and/or fellows, to perform the following surgical operation/ procedure and administer such anesthesia as may be determined necessary by my physician: Operation/Procedure name (s) CYSTOSCOPY, RIGHT RETROGRADE PYELOGRAM, INSERTION OF RIGHT URETERAL STENT on Bartolo LYLY Veliz   2.   I recognize that during the surgical operation/procedure, unforeseen conditions may necessitate additional or different procedures than those listed above.  I, therefore, further authorize and request that the above-named surgeon, assistants, or designees perform such procedures as are, in their judgment, necessary and desirable.    3.   My surgeon/physician has discussed prior to my surgery the potential benefits, risks and side effects of this procedure; the likelihood of achieving goals; and potential problems that might occur during recuperation.  They also discussed reasonable alternatives to the procedure, including risks, benefits, and side effects related to the alternatives and risks related to not receiving this procedure.  I have had all my questions answered and I acknowledge that no guarantee has been made as to the result that may be obtained.    4.   Should the need arise during my operation/procedure, which includes change of level of care prior to discharge, I also consent to the administration of blood and/or blood products.  Further, I understand that despite careful testing and screening of blood or blood products by collecting agencies, I may still be subject to ill effects as a result of receiving a blood transfusion and/or blood products.  The following are some, but not all, of the  potential risks that can occur: fever and allergic reactions, hemolytic reactions, transmission of diseases such as Hepatitis, AIDS and Cytomegalovirus (CMV) and fluid overload.  In the event that I wish to have an autologous transfusion of my own blood, or a directed donor transfusion, I will discuss this with my physician.  Check only if Refusing Blood or Blood Products  I understand refusal of blood or blood products as deemed necessary by my physician may have serious consequences to my condition to include possible death. I hereby assume responsibility for my refusal and release the hospital, its personnel, and my physicians from any responsibility for the consequences of my refusal.    o  Refuse   5.   I authorize the use of any specimen, organs, tissues, body parts or foreign objects that may be removed from my body during the operation/procedure for diagnosis, research or teaching purposes and their subsequent disposal by hospital authorities.  I also authorize the release of specimen test results and/or written reports to my treating physician on the hospital medical staff or other referring or consulting physicians involved in my care, at the discretion of the Pathologist or my treating physician.    6.   I consent to the photographing or videotaping of the operations or procedures to be performed, including appropriate portions of my body for medical, scientific, or educational purposes, provided my identity is not revealed by the pictures or by descriptive texts accompanying them.  If the procedure has been photographed/videotaped, the surgeon will obtain the original picture, image, videotape or CD.  The hospital will not be responsible for storage, release or maintenance of the picture, image, tape or CD.    7.   I consent to the presence of a  or observers in the operating room as deemed necessary by my physician or their designees.    8.   I recognize that in the event my procedure  results in extended X-Ray/fluoroscopy time, I may develop a skin reaction.    9. If I have a Do Not Attempt Resuscitation (DNAR) order in place, that status will be suspended while in the operating room, procedural suite, and during the recovery period unless otherwise explicitly stated by me (or a person authorized to consent on my behalf). The surgeon or my attending physician will determine when the applicable recovery period ends for purposes of reinstating the DNAR order.  10. Patients having a sterilization procedure: I understand that if the procedure is successful the results will be permanent and it will therefore be impossible for me to inseminate, conceive, or bear children.  I also understand that the procedure is intended to result in sterility, although the result has not been guaranteed.   11. I acknowledge that my physician has explained sedation/analgesia administration to me including the risk and benefits I consent to the administration of sedation/analgesia as may be necessary or desirable in the judgment of my physician.    I CERTIFY THAT I HAVE READ AND FULLY UNDERSTAND THE ABOVE CONSENT TO OPERATION and/or OTHER PROCEDURE.     _________________________________________ _________________________________     ___________________________________  Signature of Patient     Signature of Responsible Person                   Printed Name of Responsible Person                              _________________________________________ ______________________________        ___________________________________  Signature of Witness         Date  Time         Relationship to Patient    STATEMENT OF PHYSICIAN My signature below affirms that prior to the time of the procedure; I have explained to the patient and/or his/her legal representative, the risks and benefits involved in the proposed treatment and any reasonable alternative to the proposed treatment. I have also explained the risks and benefits involved in  refusal of the proposed treatment and alternatives to the proposed treatment and have answered the patient's questions. If I have a significant financial interest in a co-management agreement or a significant financial interest in any product or implant, or other significant relationship used in this procedure/surgery, I have disclosed this and had a discussion with my patient.     _______________________________________________________________ _____________________________  (Signature of Physician)                                                                                         (Date)                                   (Time)  Patient Name: Bartolo LYLY Veliz    : 2000   Printed: 2024      Medical Record #: [id number]                                              Page 1 of 1

## (undated) NOTE — LETTER
20    Patient: Andrews Kearney  : 2000 Visit date: 2020    Dear  Dr. Sunni Pereyra MD,    Thank you for referring Andrews Kearney to my practice. Please find my assessment and plan below.         Assessment   Breast mass in male  (primary en the mammogram with the right gynecomastia being greater than the left.   It was discussed with the patient that the procedure for excising this would be to remove all of the breast tissue bilaterally for the asymmetric breast masses that are identified on b

## (undated) NOTE — LETTER
Date & Time: 2/23/2024, 3:30 AM  Patient: Bartolo Veliz  Encounter Provider(s):    Maria Isabel Guido DO       To Whom It May Concern:    Bartolo Veliz was seen and treated in our department on 2/23/2024. He should not return to work until 2/26/24 .    If you have any questions or concerns, please do not hesitate to call.        _____________________________  Physician/APC Signature

## (undated) NOTE — ED AVS SNAPSHOT
[facility] in [address] [place] [address] Drive,[address] Floor    [address] [facility]    Phone:  [phone]    Fax:  [phone]   MRN: [id number]    Department:  [facility] END   Date of Visit:  1/7/2017 Where to Get Your Medications      These medications were sent to [id number] [address], [phone], [address]  [facility] [address]     Phone:  [phone] - al You were examined and treated today on an urgent basis only. This was not a substitute for ongoing medical care. Often, one Immediate Care visit does not uncover every injury or illness.  If you have been referred to a primary care or a specialist physicia Edgardo Villatoro Mary Hobbs ([address]) [phone] [address] [facility]. [address]) [phone]                Additional Information       We are concerned for your overall well being:    - If you are a smoker o [employer] will allow you to access patient instructions from your recent visit,  view other health information, and more. To sign up or find more information, go to [email or web address] and click on the Sign Up Now link in the [employer] box.      Enter

## (undated) NOTE — LETTER
[facility] CARE IN [place] [id number] [address]  Dept: [phone]  Dept Fax: [phone]         January 7, 2017    Patient: Solis Dominguez   YOB: 2000   Date of Visit: 1/7/2017       To Whom It May Concern: